# Patient Record
Sex: MALE | Race: WHITE | NOT HISPANIC OR LATINO | Employment: OTHER | URBAN - METROPOLITAN AREA
[De-identification: names, ages, dates, MRNs, and addresses within clinical notes are randomized per-mention and may not be internally consistent; named-entity substitution may affect disease eponyms.]

---

## 2017-01-03 ENCOUNTER — LAB CONVERSION - ENCOUNTER (OUTPATIENT)
Dept: OTHER | Facility: OTHER | Age: 47
End: 2017-01-03

## 2017-01-03 LAB
A/G RATIO (HISTORICAL): 1.6 (CALC) (ref 1–2.5)
ALBUMIN SERPL BCP-MCNC: 3.8 G/DL (ref 3.6–5.1)
ALP SERPL-CCNC: 95 U/L (ref 40–115)
ALT SERPL W P-5'-P-CCNC: 20 U/L (ref 9–46)
AST SERPL W P-5'-P-CCNC: 17 U/L (ref 10–40)
BASOPHILS # BLD AUTO: 0.3 %
BASOPHILS # BLD AUTO: 21 CELLS/UL (ref 0–200)
BILIRUB SERPL-MCNC: 0.8 MG/DL (ref 0.2–1.2)
BUN SERPL-MCNC: 24 MG/DL (ref 7–25)
BUN/CREA RATIO (HISTORICAL): 17 (CALC) (ref 6–22)
CALCIUM SERPL-MCNC: 8.7 MG/DL (ref 8.6–10.3)
CHLORIDE SERPL-SCNC: 108 MMOL/L (ref 98–110)
CHOLEST SERPL-MCNC: 132 MG/DL (ref 125–200)
CHOLEST/HDLC SERPL: 3.1 (CALC)
CO2 SERPL-SCNC: 21 MMOL/L (ref 20–31)
CREAT SERPL-MCNC: 1.39 MG/DL (ref 0.6–1.35)
DEPRECATED RDW RBC AUTO: 14.7 % (ref 11–15)
EGFR AFRICAN AMERICAN (HISTORICAL): 69 ML/MIN/1.73M2
EGFR-AMERICAN CALC (HISTORICAL): 60 ML/MIN/1.73M2
EOSINOPHIL # BLD AUTO: 234 CELLS/UL (ref 15–500)
EOSINOPHIL # BLD AUTO: 3.3 %
GAMMA GLOBULIN (HISTORICAL): 2.4 G/DL (CALC) (ref 1.9–3.7)
GLUCOSE (HISTORICAL): 94 MG/DL (ref 65–99)
HCT VFR BLD AUTO: 47.1 % (ref 38.5–50)
HDLC SERPL-MCNC: 43 MG/DL
HGB BLD-MCNC: 15.5 G/DL (ref 13.2–17.1)
INR PPP: 1.2
LDL CHOLESTEROL (HISTORICAL): 75 MG/DL (CALC)
LYMPHOCYTES # BLD AUTO: 1960 CELLS/UL (ref 850–3900)
LYMPHOCYTES # BLD AUTO: 27.6 %
MCH RBC QN AUTO: 28.4 PG (ref 27–33)
MCHC RBC AUTO-ENTMCNC: 33 G/DL (ref 32–36)
MCV RBC AUTO: 86 FL (ref 80–100)
MONOCYTES # BLD AUTO: 781 CELLS/UL (ref 200–950)
MONOCYTES (HISTORICAL): 11 %
NEUTROPHILS # BLD AUTO: 4104 CELLS/UL (ref 1500–7800)
NEUTROPHILS # BLD AUTO: 57.8 %
NON-HDL-CHOL (CHOL-HDL) (HISTORICAL): 89 MG/DL (CALC)
PLATELET # BLD AUTO: 186 THOUSAND/UL (ref 140–400)
PMV BLD AUTO: 8.4 FL (ref 7.5–11.5)
POTASSIUM SERPL-SCNC: 3.9 MMOL/L (ref 3.5–5.3)
PROTHROMBIN TIME: 11.9 SEC (ref 9–11.5)
RBC # BLD AUTO: 5.48 MILLION/UL (ref 4.2–5.8)
SODIUM SERPL-SCNC: 141 MMOL/L (ref 135–146)
TOTAL PROTEIN (HISTORICAL): 6.2 G/DL (ref 6.1–8.1)
TRIGL SERPL-MCNC: 71 MG/DL
WBC # BLD AUTO: 7.1 THOUSAND/UL (ref 3.8–10.8)

## 2017-01-05 ENCOUNTER — GENERIC CONVERSION - ENCOUNTER (OUTPATIENT)
Dept: OTHER | Facility: OTHER | Age: 47
End: 2017-01-05

## 2017-02-02 DIAGNOSIS — Z94.4 HISTORY OF LIVER TRANSPLANT (HCC): ICD-10-CM

## 2017-02-02 DIAGNOSIS — N18.30 CHRONIC KIDNEY DISEASE, STAGE III (MODERATE) (HCC): ICD-10-CM

## 2017-02-02 DIAGNOSIS — Z41.8 ENCOUNTER FOR OTHER PROCEDURES FOR PURPOSES OTHER THAN REMEDYING HEALTH STATE: ICD-10-CM

## 2017-02-02 DIAGNOSIS — K21.9 GASTRO-ESOPHAGEAL REFLUX DISEASE WITHOUT ESOPHAGITIS: ICD-10-CM

## 2017-02-02 DIAGNOSIS — Z79.899 OTHER LONG TERM (CURRENT) DRUG THERAPY: ICD-10-CM

## 2017-02-02 DIAGNOSIS — E66.9 OBESITY: ICD-10-CM

## 2017-03-03 ENCOUNTER — GENERIC CONVERSION - ENCOUNTER (OUTPATIENT)
Dept: OTHER | Facility: OTHER | Age: 47
End: 2017-03-03

## 2017-03-03 ENCOUNTER — LAB CONVERSION - ENCOUNTER (OUTPATIENT)
Dept: OTHER | Facility: OTHER | Age: 47
End: 2017-03-03

## 2017-03-03 LAB — TACROLIMUS BLOOD (HISTORICAL): 6.5 MCG/L

## 2017-03-09 DIAGNOSIS — Z94.4 HISTORY OF LIVER TRANSPLANT (HCC): ICD-10-CM

## 2017-03-09 DIAGNOSIS — N18.30 CHRONIC KIDNEY DISEASE, STAGE III (MODERATE) (HCC): ICD-10-CM

## 2017-03-09 DIAGNOSIS — K21.9 GASTRO-ESOPHAGEAL REFLUX DISEASE WITHOUT ESOPHAGITIS: ICD-10-CM

## 2017-03-09 DIAGNOSIS — Z79.899 OTHER LONG TERM (CURRENT) DRUG THERAPY: ICD-10-CM

## 2017-03-09 DIAGNOSIS — E66.9 OBESITY: ICD-10-CM

## 2017-03-09 DIAGNOSIS — Z41.8 ENCOUNTER FOR OTHER PROCEDURES FOR PURPOSES OTHER THAN REMEDYING HEALTH STATE: ICD-10-CM

## 2017-03-09 DIAGNOSIS — Z29.8 ENCOUNTER FOR OTHER SPECIFIED PROPHYLACTIC MEASURES: ICD-10-CM

## 2017-04-13 DIAGNOSIS — N18.30 CHRONIC KIDNEY DISEASE, STAGE III (MODERATE) (HCC): ICD-10-CM

## 2017-04-13 DIAGNOSIS — Z79.899 OTHER LONG TERM (CURRENT) DRUG THERAPY: ICD-10-CM

## 2017-04-13 DIAGNOSIS — E66.9 OBESITY: ICD-10-CM

## 2017-04-13 DIAGNOSIS — Z29.8 ENCOUNTER FOR OTHER SPECIFIED PROPHYLACTIC MEASURES: ICD-10-CM

## 2017-04-13 DIAGNOSIS — K21.9 GASTRO-ESOPHAGEAL REFLUX DISEASE WITHOUT ESOPHAGITIS: ICD-10-CM

## 2017-04-13 DIAGNOSIS — Z94.4 HISTORY OF LIVER TRANSPLANT (HCC): ICD-10-CM

## 2017-05-12 ENCOUNTER — LAB CONVERSION - ENCOUNTER (OUTPATIENT)
Dept: OTHER | Facility: OTHER | Age: 47
End: 2017-05-12

## 2017-05-12 LAB
A/G RATIO (HISTORICAL): 1.5 (CALC) (ref 1–2.5)
ALBUMIN SERPL BCP-MCNC: 3.8 G/DL (ref 3.6–5.1)
ALP SERPL-CCNC: 92 U/L (ref 40–115)
ALT SERPL W P-5'-P-CCNC: 13 U/L (ref 9–46)
AST SERPL W P-5'-P-CCNC: 15 U/L (ref 10–40)
BASOPHILS # BLD AUTO: 0 %
BASOPHILS # BLD AUTO: 0 CELLS/UL (ref 0–200)
BILIRUB SERPL-MCNC: 0.9 MG/DL (ref 0.2–1.2)
BUN SERPL-MCNC: 23 MG/DL (ref 7–25)
BUN/CREA RATIO (HISTORICAL): 16 (CALC) (ref 6–22)
CALCIUM SERPL-MCNC: 8.9 MG/DL (ref 8.6–10.3)
CHLORIDE SERPL-SCNC: 108 MMOL/L (ref 98–110)
CHOLEST SERPL-MCNC: 123 MG/DL (ref 125–200)
CHOLEST/HDLC SERPL: 3.5 (CALC)
CO2 SERPL-SCNC: 25 MMOL/L (ref 20–31)
CREAT SERPL-MCNC: 1.42 MG/DL (ref 0.6–1.35)
DEPRECATED RDW RBC AUTO: 14.8 % (ref 11–15)
EGFR AFRICAN AMERICAN (HISTORICAL): 67 ML/MIN/1.73M2
EGFR-AMERICAN CALC (HISTORICAL): 58 ML/MIN/1.73M2
EOSINOPHIL # BLD AUTO: 160 CELLS/UL (ref 15–500)
EOSINOPHIL # BLD AUTO: 2.5 %
GAMMA GLOBULIN (HISTORICAL): 2.5 G/DL (CALC) (ref 1.9–3.7)
GLUCOSE (HISTORICAL): 90 MG/DL (ref 65–99)
HCT VFR BLD AUTO: 47.6 % (ref 38.5–50)
HDLC SERPL-MCNC: 35 MG/DL
HGB BLD-MCNC: 15.9 G/DL (ref 13.2–17.1)
INR PPP: 1.1
LDL CHOLESTEROL (HISTORICAL): 68 MG/DL (CALC)
LYMPHOCYTES # BLD AUTO: 2278 CELLS/UL (ref 850–3900)
LYMPHOCYTES # BLD AUTO: 35.6 %
MCH RBC QN AUTO: 29.3 PG (ref 27–33)
MCHC RBC AUTO-ENTMCNC: 33.4 G/DL (ref 32–36)
MCV RBC AUTO: 87.9 FL (ref 80–100)
MONOCYTES # BLD AUTO: 448 CELLS/UL (ref 200–950)
MONOCYTES (HISTORICAL): 7 %
NEUTROPHILS # BLD AUTO: 3514 CELLS/UL (ref 1500–7800)
NEUTROPHILS # BLD AUTO: 54.9 %
NON-HDL-CHOL (CHOL-HDL) (HISTORICAL): 88 MG/DL (CALC)
PLATELET # BLD AUTO: 190 THOUSAND/UL (ref 140–400)
PMV BLD AUTO: 8.1 FL (ref 7.5–12.5)
POTASSIUM SERPL-SCNC: 4.3 MMOL/L (ref 3.5–5.3)
PROTHROMBIN TIME: 11.7 SEC (ref 9–11.5)
RBC # BLD AUTO: 5.42 MILLION/UL (ref 4.2–5.8)
SODIUM SERPL-SCNC: 141 MMOL/L (ref 135–146)
TACROLIMUS BLOOD (HISTORICAL): 6.8 MCG/L
TOTAL PROTEIN (HISTORICAL): 6.3 G/DL (ref 6.1–8.1)
TRIGL SERPL-MCNC: 100 MG/DL
WBC # BLD AUTO: 6.4 THOUSAND/UL (ref 3.8–10.8)

## 2017-05-18 DIAGNOSIS — Z94.4 HISTORY OF LIVER TRANSPLANT (HCC): ICD-10-CM

## 2017-05-18 DIAGNOSIS — N18.30 CHRONIC KIDNEY DISEASE, STAGE III (MODERATE) (HCC): ICD-10-CM

## 2017-05-18 DIAGNOSIS — E66.9 OBESITY: ICD-10-CM

## 2017-05-18 DIAGNOSIS — K21.9 GASTRO-ESOPHAGEAL REFLUX DISEASE WITHOUT ESOPHAGITIS: ICD-10-CM

## 2017-05-18 DIAGNOSIS — Z29.8 ENCOUNTER FOR OTHER SPECIFIED PROPHYLACTIC MEASURES: ICD-10-CM

## 2017-05-18 DIAGNOSIS — Z79.899 OTHER LONG TERM (CURRENT) DRUG THERAPY: ICD-10-CM

## 2017-06-22 DIAGNOSIS — I10 ESSENTIAL (PRIMARY) HYPERTENSION: ICD-10-CM

## 2017-06-22 DIAGNOSIS — Z79.899 OTHER LONG TERM (CURRENT) DRUG THERAPY: ICD-10-CM

## 2017-06-22 DIAGNOSIS — K21.9 GASTRO-ESOPHAGEAL REFLUX DISEASE WITHOUT ESOPHAGITIS: ICD-10-CM

## 2017-06-22 DIAGNOSIS — E66.9 OBESITY: ICD-10-CM

## 2017-06-22 DIAGNOSIS — Z94.4 HISTORY OF LIVER TRANSPLANT (HCC): ICD-10-CM

## 2017-06-22 DIAGNOSIS — Z29.8 ENCOUNTER FOR OTHER SPECIFIED PROPHYLACTIC MEASURES: ICD-10-CM

## 2017-06-22 DIAGNOSIS — N18.30 CHRONIC KIDNEY DISEASE, STAGE III (MODERATE) (HCC): ICD-10-CM

## 2017-06-22 DIAGNOSIS — R60.9 EDEMA: ICD-10-CM

## 2017-06-29 ENCOUNTER — ALLSCRIPTS OFFICE VISIT (OUTPATIENT)
Dept: OTHER | Facility: OTHER | Age: 47
End: 2017-06-29

## 2017-07-07 ENCOUNTER — LAB CONVERSION - ENCOUNTER (OUTPATIENT)
Dept: OTHER | Facility: OTHER | Age: 47
End: 2017-07-07

## 2017-07-07 ENCOUNTER — GENERIC CONVERSION - ENCOUNTER (OUTPATIENT)
Dept: OTHER | Facility: OTHER | Age: 47
End: 2017-07-07

## 2017-07-07 LAB
A/G RATIO (HISTORICAL): 1.5 (CALC) (ref 1–2.5)
ALBUMIN SERPL BCP-MCNC: 3.7 G/DL (ref 3.6–5.1)
ALP SERPL-CCNC: 97 U/L (ref 40–115)
ALT SERPL W P-5'-P-CCNC: 14 U/L (ref 9–46)
AST SERPL W P-5'-P-CCNC: 15 U/L (ref 10–40)
BASOPHILS # BLD AUTO: 0.2 %
BASOPHILS # BLD AUTO: 13 CELLS/UL (ref 0–200)
BILIRUB SERPL-MCNC: 1 MG/DL (ref 0.2–1.2)
BUN SERPL-MCNC: 21 MG/DL (ref 7–25)
BUN/CREA RATIO (HISTORICAL): 15 (CALC) (ref 6–22)
CALCIUM SERPL-MCNC: 8.9 MG/DL (ref 8.6–10.3)
CHLORIDE SERPL-SCNC: 110 MMOL/L (ref 98–110)
CHOLEST SERPL-MCNC: 121 MG/DL (ref 125–200)
CHOLEST/HDLC SERPL: 3.2 (CALC)
CO2 SERPL-SCNC: 24 MMOL/L (ref 20–31)
CREAT SERPL-MCNC: 1.38 MG/DL (ref 0.6–1.35)
DEPRECATED RDW RBC AUTO: 15.7 % (ref 11–15)
EGFR AFRICAN AMERICAN (HISTORICAL): 70 ML/MIN/1.73M2
EGFR-AMERICAN CALC (HISTORICAL): 60 ML/MIN/1.73M2
EOSINOPHIL # BLD AUTO: 195 CELLS/UL (ref 15–500)
EOSINOPHIL # BLD AUTO: 3 %
GAMMA GLOBULIN (HISTORICAL): 2.5 G/DL (CALC) (ref 1.9–3.7)
GLUCOSE (HISTORICAL): 92 MG/DL (ref 65–99)
HCT VFR BLD AUTO: 48.6 % (ref 38.5–50)
HDLC SERPL-MCNC: 38 MG/DL
HGB BLD-MCNC: 16 G/DL (ref 13.2–17.1)
INR PPP: 1.1
LDL CHOLESTEROL (HISTORICAL): 68 MG/DL (CALC)
LYMPHOCYTES # BLD AUTO: 1983 CELLS/UL (ref 850–3900)
LYMPHOCYTES # BLD AUTO: 30.5 %
MCH RBC QN AUTO: 28.8 PG (ref 27–33)
MCHC RBC AUTO-ENTMCNC: 32.8 G/DL (ref 32–36)
MCV RBC AUTO: 87.7 FL (ref 80–100)
MONOCYTES # BLD AUTO: 494 CELLS/UL (ref 200–950)
MONOCYTES (HISTORICAL): 7.6 %
NEUTROPHILS # BLD AUTO: 3816 CELLS/UL (ref 1500–7800)
NEUTROPHILS # BLD AUTO: 58.7 %
NON-HDL-CHOL (CHOL-HDL) (HISTORICAL): 83 MG/DL (CALC)
PLATELET # BLD AUTO: 205 THOUSAND/UL (ref 140–400)
PMV BLD AUTO: 8.1 FL (ref 7.5–12.5)
POTASSIUM SERPL-SCNC: 4.3 MMOL/L (ref 3.5–5.3)
PROTHROMBIN TIME: 11.5 SEC (ref 9–11.5)
RBC # BLD AUTO: 5.54 MILLION/UL (ref 4.2–5.8)
SODIUM SERPL-SCNC: 143 MMOL/L (ref 135–146)
TOTAL PROTEIN (HISTORICAL): 6.2 G/DL (ref 6.1–8.1)
TRIGL SERPL-MCNC: 73 MG/DL
WBC # BLD AUTO: 6.5 THOUSAND/UL (ref 3.8–10.8)

## 2017-07-27 DIAGNOSIS — E66.9 OBESITY: ICD-10-CM

## 2017-07-27 DIAGNOSIS — Z79.899 OTHER LONG TERM (CURRENT) DRUG THERAPY: ICD-10-CM

## 2017-07-27 DIAGNOSIS — Z29.8 ENCOUNTER FOR OTHER SPECIFIED PROPHYLACTIC MEASURES: ICD-10-CM

## 2017-07-27 DIAGNOSIS — Z94.4 HISTORY OF LIVER TRANSPLANT (HCC): ICD-10-CM

## 2017-07-27 DIAGNOSIS — K21.9 GASTRO-ESOPHAGEAL REFLUX DISEASE WITHOUT ESOPHAGITIS: ICD-10-CM

## 2017-07-27 DIAGNOSIS — N18.30 CHRONIC KIDNEY DISEASE, STAGE III (MODERATE) (HCC): ICD-10-CM

## 2017-08-31 DIAGNOSIS — Z94.4 HISTORY OF LIVER TRANSPLANT (HCC): ICD-10-CM

## 2017-08-31 DIAGNOSIS — Z79.899 OTHER LONG TERM (CURRENT) DRUG THERAPY: ICD-10-CM

## 2017-08-31 DIAGNOSIS — K21.9 GASTRO-ESOPHAGEAL REFLUX DISEASE WITHOUT ESOPHAGITIS: ICD-10-CM

## 2017-08-31 DIAGNOSIS — N18.30 CHRONIC KIDNEY DISEASE, STAGE III (MODERATE) (HCC): ICD-10-CM

## 2017-08-31 DIAGNOSIS — E66.9 OBESITY: ICD-10-CM

## 2017-08-31 DIAGNOSIS — Z29.8 ENCOUNTER FOR OTHER SPECIFIED PROPHYLACTIC MEASURES: ICD-10-CM

## 2017-09-08 ENCOUNTER — GENERIC CONVERSION - ENCOUNTER (OUTPATIENT)
Dept: OTHER | Facility: OTHER | Age: 47
End: 2017-09-08

## 2017-09-08 ENCOUNTER — LAB CONVERSION - ENCOUNTER (OUTPATIENT)
Dept: OTHER | Facility: OTHER | Age: 47
End: 2017-09-08

## 2017-09-08 LAB
A/G RATIO (HISTORICAL): 1.6 (CALC) (ref 1–2.5)
ALBUMIN SERPL BCP-MCNC: 3.5 G/DL (ref 3.6–5.1)
ALP SERPL-CCNC: 88 U/L (ref 40–115)
ALT SERPL W P-5'-P-CCNC: 13 U/L (ref 9–46)
AST SERPL W P-5'-P-CCNC: 13 U/L (ref 10–40)
BASOPHILS # BLD AUTO: 0.3 %
BASOPHILS # BLD AUTO: 20 CELLS/UL (ref 0–200)
BILIRUB SERPL-MCNC: 0.8 MG/DL (ref 0.2–1.2)
BUN SERPL-MCNC: 23 MG/DL (ref 7–25)
BUN/CREA RATIO (HISTORICAL): ABNORMAL (CALC) (ref 6–22)
CALCIUM SERPL-MCNC: 8.9 MG/DL (ref 8.6–10.3)
CHLORIDE SERPL-SCNC: 109 MMOL/L (ref 98–110)
CHOLEST SERPL-MCNC: 124 MG/DL
CHOLEST/HDLC SERPL: 3.4 (CALC)
CO2 SERPL-SCNC: 27 MMOL/L (ref 20–31)
CREAT SERPL-MCNC: 1.23 MG/DL (ref 0.6–1.35)
DEPRECATED RDW RBC AUTO: 14.7 % (ref 11–15)
EGFR AFRICAN AMERICAN (HISTORICAL): 80 ML/MIN/1.73M2
EGFR-AMERICAN CALC (HISTORICAL): 69 ML/MIN/1.73M2
EOSINOPHIL # BLD AUTO: 288 CELLS/UL (ref 15–500)
EOSINOPHIL # BLD AUTO: 4.3 %
GAMMA GLOBULIN (HISTORICAL): 2.2 G/DL (CALC) (ref 1.9–3.7)
GLUCOSE (HISTORICAL): 101 MG/DL (ref 65–99)
HCT VFR BLD AUTO: 45.2 % (ref 38.5–50)
HDLC SERPL-MCNC: 37 MG/DL
HGB BLD-MCNC: 15.3 G/DL (ref 13.2–17.1)
INR PPP: 1.1
LDL CHOLESTEROL (HISTORICAL): 72 MG/DL (CALC)
LYMPHOCYTES # BLD AUTO: 2050 CELLS/UL (ref 850–3900)
LYMPHOCYTES # BLD AUTO: 30.6 %
MCH RBC QN AUTO: 29.3 PG (ref 27–33)
MCHC RBC AUTO-ENTMCNC: 33.8 G/DL (ref 32–36)
MCV RBC AUTO: 86.6 FL (ref 80–100)
MONOCYTES # BLD AUTO: 556 CELLS/UL (ref 200–950)
MONOCYTES (HISTORICAL): 8.3 %
NEUTROPHILS # BLD AUTO: 3786 CELLS/UL (ref 1500–7800)
NEUTROPHILS # BLD AUTO: 56.5 %
NON-HDL-CHOL (CHOL-HDL) (HISTORICAL): 87 MG/DL (CALC)
PLATELET # BLD AUTO: 188 THOUSAND/UL (ref 140–400)
PMV BLD AUTO: 10 FL (ref 7.5–12.5)
POTASSIUM SERPL-SCNC: 4.3 MMOL/L (ref 3.5–5.3)
PROTHROMBIN TIME: 11.2 SEC (ref 9–11.5)
RBC # BLD AUTO: 5.22 MILLION/UL (ref 4.2–5.8)
SODIUM SERPL-SCNC: 143 MMOL/L (ref 135–146)
TOTAL PROTEIN (HISTORICAL): 5.7 G/DL (ref 6.1–8.1)
TRIGL SERPL-MCNC: 72 MG/DL
WBC # BLD AUTO: 6.7 THOUSAND/UL (ref 3.8–10.8)

## 2017-10-05 DIAGNOSIS — N18.30 CHRONIC KIDNEY DISEASE, STAGE III (MODERATE) (HCC): ICD-10-CM

## 2017-10-05 DIAGNOSIS — Z29.8 ENCOUNTER FOR OTHER SPECIFIED PROPHYLACTIC MEASURES: ICD-10-CM

## 2017-10-05 DIAGNOSIS — Z94.4 HISTORY OF LIVER TRANSPLANT (HCC): ICD-10-CM

## 2017-10-05 DIAGNOSIS — K21.9 GASTRO-ESOPHAGEAL REFLUX DISEASE WITHOUT ESOPHAGITIS: ICD-10-CM

## 2017-10-05 DIAGNOSIS — Z79.899 OTHER LONG TERM (CURRENT) DRUG THERAPY: ICD-10-CM

## 2017-10-05 DIAGNOSIS — E66.9 OBESITY: ICD-10-CM

## 2017-10-12 ENCOUNTER — ALLSCRIPTS OFFICE VISIT (OUTPATIENT)
Dept: OTHER | Facility: OTHER | Age: 47
End: 2017-10-12

## 2017-11-09 DIAGNOSIS — E66.9 OBESITY: ICD-10-CM

## 2017-11-09 DIAGNOSIS — Z79.899 OTHER LONG TERM (CURRENT) DRUG THERAPY: ICD-10-CM

## 2017-11-09 DIAGNOSIS — N18.30 CHRONIC KIDNEY DISEASE, STAGE III (MODERATE) (HCC): ICD-10-CM

## 2017-11-09 DIAGNOSIS — Z29.8 ENCOUNTER FOR OTHER SPECIFIED PROPHYLACTIC MEASURES: ICD-10-CM

## 2017-11-09 DIAGNOSIS — K21.9 GASTRO-ESOPHAGEAL REFLUX DISEASE WITHOUT ESOPHAGITIS: ICD-10-CM

## 2017-11-09 DIAGNOSIS — Z94.4 HISTORY OF LIVER TRANSPLANT (HCC): ICD-10-CM

## 2017-12-14 ENCOUNTER — LAB CONVERSION - ENCOUNTER (OUTPATIENT)
Dept: OTHER | Facility: OTHER | Age: 47
End: 2017-12-14

## 2017-12-14 DIAGNOSIS — E66.9 OBESITY: ICD-10-CM

## 2017-12-14 DIAGNOSIS — Z29.8 ENCOUNTER FOR OTHER SPECIFIED PROPHYLACTIC MEASURES: ICD-10-CM

## 2017-12-14 DIAGNOSIS — Z79.899 OTHER LONG TERM (CURRENT) DRUG THERAPY: ICD-10-CM

## 2017-12-14 DIAGNOSIS — K21.9 GASTRO-ESOPHAGEAL REFLUX DISEASE WITHOUT ESOPHAGITIS: ICD-10-CM

## 2017-12-14 DIAGNOSIS — Z94.4 HISTORY OF LIVER TRANSPLANT (HCC): ICD-10-CM

## 2017-12-14 DIAGNOSIS — N18.30 CHRONIC KIDNEY DISEASE, STAGE III (MODERATE) (HCC): ICD-10-CM

## 2017-12-14 LAB — TACROLIMUS BLOOD (HISTORICAL): 7.2 MCG/L

## 2018-01-09 NOTE — RESULT NOTES
Message   chemiistry/ cbc/ tac level/ipid profile - stable     Verified Results  (1) COMPREHENSIVE METABOLIC PANEL [id number] 43:80SN Alan Emmanuel     Test Name Result Flag Reference   GLUCOSE 96 mg/dL  65-99   Fasting reference interval   UREA NITROGEN (BUN) 22 mg/dL  7-25   CREATININE 1 31 mg/dL  0 60-1 35   eGFR NON-AFR  AMERICAN 64 mL/min/1 73m2  > OR = 60   eGFR AFRICAN AMERICAN 74 mL/min/1 73m2  > OR = 60   BUN/CREATININE RATIO   1-45   NOT APPLICABLE (calc)   SODIUM 141 mmol/L  135-146   POTASSIUM 3 9 mmol/L  3 5-5 3   CHLORIDE 108 mmol/L     CARBON DIOXIDE 22 mmol/L  20-31   CALCIUM 8 7 mg/dL  8 6-10 3   PROTEIN, TOTAL 6 3 g/dL  6 1-8 1   ALBUMIN 3 8 g/dL  3 6-5 1   GLOBULIN 2 5 g/dL (calc)  1 9-3 7   ALBUMIN/GLOBULIN RATIO 1 5 (calc)  1 0-2 5   BILIRUBIN, TOTAL 0 8 mg/dL  0 2-1 2   ALKALINE PHOSPHATASE 86 U/L     AST 14 U/L  10-40   ALT 14 U/L  9-46     (1) LIPID PANEL, FASTING [id number] 08:26AM Claudjere Lulas     Test Name Result Flag Reference   CHOLESTEROL, TOTAL 119 mg/dL L 125-200   HDL CHOLESTEROL 36 mg/dL L > OR = 40   TRIGLICERIDES 85 mg/dL  <706   LDL-CHOLESTEROL 66 mg/dL (calc)  <130   Desirable range <100 mg/dL for patients with CHD or  diabetes and <70 mg/dL for diabetic patients with  known heart disease  CHOL/HDLC RATIO 3 3 (calc)  < OR = 5 0   NON HDL CHOLESTEROL 83 mg/dL (calc)     Target for non-HDL cholesterol is 30 mg/dL higher than   LDL cholesterol target  (1) PT WITH INR [id number] 08:26AM Claudjere Lulas     Test Name Result Flag Reference   INR 1 1     Reference Range                     0 9-1 1  Moderate-intensity Warfarin Therapy 2 0-3 0  Higher-intensity Warfarin Therapy   3 0-4 0   PT 11 5 sec  9 0-11 5   For more information on this test, go to:  http://[employer]/faq/WMC127     (1) CBC/PLT/DIFF [id number] 08:26AM Claudis Lulas     Test Name Result Flag Reference   WHITE BLOOD CELL COUNT 7 3 Thousand/uL  3 8-10 8   RED BLOOD CELL COUNT 5 55 Million/uL  4 20-5 80   HEMOGLOBIN 15 9 g/dL  13 2-17 1   HEMATOCRIT 47 7 %  38 5-50 0   MCV 86 0 fL  80 0-100 0   MCH 28 6 pg  27 0-33 0   MCHC 33 3 g/dL  32 0-36 0   RDW 14 8 %  11 0-15 0   PLATELET COUNT 458 Thousand/uL  140-400   MPV 8 2 fL  7 5-12 5   ABSOLUTE NEUTROPHILS 4365 cells/uL  4558-4317   ABSOLUTE LYMPHOCYTES 2066 cells/uL  850-3900   ABSOLUTE MONOCYTES 555 cells/uL  200-950   ABSOLUTE EOSINOPHILS 307 cells/uL     ABSOLUTE BASOPHILS 7 cells/uL  0-200   NEUTROPHILS 59 8 %     LYMPHOCYTES 28 3 %     MONOCYTES 7 6 %     EOSINOPHILS 4 2 %     BASOPHILS 0 1 %       (Q) TACROLIMUS, HIGHLY SENSITIVE, LC/MS/MS [id number] 08:26AM [facility]   REPORT COMMENT:  FASTING:YES     Test Name Result Flag Reference   TACROLIMUS, HIGHLY SENSITIVE, LC/MS/MS 6 5 mcg/L     No definitive therapeutic or toxic ranges have been  established  Optimal blood drug levels are influenced  by type of transplant, patient response, time post-  transplant, co-administration of other drugs, and   drug formulation  The following trough range is a   suggested guideline: 5 0-20 0 mcg/L

## 2018-01-11 NOTE — RESULT NOTES
WHITE BLOOD CELL COUNT 6 5 Thousand/uL  3 8-10 8   RED BLOOD CELL COUNT 5 54 Million/uL  4 20-5 80   HEMOGLOBIN 16 0 g/dL  13 2-17 1   HEMATOCRIT 48 6 %  38 5-50 0   MCV 87 7 fL  80 0-100 0   MCH 28 8 pg  27 0-33 0   MCHC 32 8 g/dL  32 0-36 0   RDW 15 7 % H 11 0-15 0   PLATELET COUNT 301 Thousand/uL  140-400   ABSOLUTE NEUTROPHILS 3816 cells/uL  2964-2111   ABSOLUTE LYMPHOCYTES 1983 cells/uL  850-3900   ABSOLUTE MONOCYTES 494 cells/uL  200-950   ABSOLUTE EOSINOPHILS 195 cells/uL     ABSOLUTE BASOPHILS 13 cells/uL  0-200   NEUTROPHILS 58 7 %     LYMPHOCYTES 30 5 %     MONOCYTES 7 6 %     EOSINOPHILS 3 0 %     BASOPHILS 0 2 %     MPV 8 1 fL  7 5-12 5     (1) PT WITH INR [id number] 07:23AM Sahara Atif     Test Name Result Flag Reference   INR 1 1     Reference Range                     0 9-1 1  Moderate-intensity Warfarin Therapy 2 0-3 0  Higher-intensity Warfarin Therapy   3 0-4 0   PT 11 5 sec  9 0-11 5   For more information on this test, go to:  http://[employer]  [employer]/faq/PEN733

## 2018-01-14 VITALS
DIASTOLIC BLOOD PRESSURE: 80 MMHG | BODY MASS INDEX: 45.1 KG/M2 | OXYGEN SATURATION: 98 % | HEART RATE: 90 BPM | WEIGHT: 315 LBS | TEMPERATURE: 97.9 F | SYSTOLIC BLOOD PRESSURE: 122 MMHG | HEIGHT: 70 IN | RESPIRATION RATE: 12 BRPM

## 2018-01-15 NOTE — RESULT NOTES
ALBUMIN/GLOBULIN RATIO 1 6 (calc)  1 0-2 5   BILIRUBIN, TOTAL 0 8 mg/dL  0 2-1 2   ALKALINE PHOSPHATASE 95 U/L     AST 17 U/L  10-40   ALT 20 U/L  9-46     (1) CBC/PLT/DIFF [id number] 07:38AM Timmothy Course     Test Name Result Flag Reference   WHITE BLOOD CELL COUNT 7 1 Thousand/uL  3 8-10 8   RED BLOOD CELL COUNT 5 48 Million/uL  4 20-5 80   HEMOGLOBIN 15 5 g/dL  13 2-17 1   HEMATOCRIT 47 1 %  38 5-50 0   MCV 86 0 fL  80 0-100 0   MCH 28 4 pg  27 0-33 0   MCHC 33 0 g/dL  32 0-36 0   RDW 14 7 %  11 0-15 0   PLATELET COUNT 944 Thousand/uL  140-400   MPV 8 4 fL  7 5-11 5   ABSOLUTE NEUTROPHILS 4104 cells/uL  4578-8607   ABSOLUTE LYMPHOCYTES 1960 cells/uL  850-3900   ABSOLUTE MONOCYTES 781 cells/uL  200-950   ABSOLUTE EOSINOPHILS 234 cells/uL     ABSOLUTE BASOPHILS 21 cells/uL  0-200   NEUTROPHILS 57 8 %     LYMPHOCYTES 27 6 %     MONOCYTES 11 0 %     EOSINOPHILS 3 3 %     BASOPHILS 0 3 %       (1) PT WITH INR [id number] 07:38AM Timmothy Course     Test Name Result Flag Reference   INR 1 2 H    Reference Range                     0 9-1 1  Moderate-intensity Warfarin Therapy 2 0-3 0  Higher-intensity Warfarin Therapy   3 0-4 0   PT 11 9 sec H 9 0-11 5   For more information on this test, go to:  http://[employer]/faq/ZIM815

## 2018-01-15 NOTE — RESULT NOTES
Verified Results  (Q) TACROLIMUS, HIGHLY SENSITIVE, LC/MS/MS [id number] 07:53AM Felicia Rosario   REPORT COMMENT:  FASTING:YES     Test Name Result Flag Reference   TACROLIMUS, HIGHLY SENSITIVE, LC/MS/MS 5 2 mcg/L     No definitive therapeutic or toxic ranges have been  established  Optimal blood drug levels are influenced  by type of transplant, patient response, time post-  transplant, co-administration of other drugs, and   drug formulation  The following trough range is a   suggested guideline: 5 0-20 0 mcg/L  (1) LIPID PANEL, FASTING [id number] 07:53AM Jayne Libel     Test Name Result Flag Reference   CHOLESTEROL, TOTAL 124 mg/dL  <200   HDL CHOLESTEROL 37 mg/dL L >80   TRIGLICERIDES 72 mg/dL  <448   LDL-CHOLESTEROL 72 mg/dL (calc)     Reference range: <100     Desirable range <100 mg/dL for patients with CHD or  diabetes and <70 mg/dL for diabetic patients with  known heart disease  The Clay-Fisher calculation is a validated novel   method that provides better accuracy than the   Friedewald equation in the estimation of LDL-C,   particularly when TG levels are 150-400 mg/dL and   LDL-C levels are lower than 70 mg/dL  Reference:  Gita Davis et al  Comparison of a Novel   Method vs the Friedewald Equation for Estimating   Low-Density Lipoprotein Cholesterol Levels From the   Standard Lipid Profile  PAULIE  8232;018(38): 8110-2066  For additional information, please refer to  http://[employer]/faq/LRG625  (This link is being provided for informational/  educational purposes only )   CHOL/HDLC RATIO 3 4 (calc)  <5 0   NON HDL CHOLESTEROL 87 mg/dL (calc)  <130   For patients with diabetes plus 1 major ASCVD risk   factor, treating to a non-HDL-C goal of <100 mg/dL   (LDL-C of <70 mg/dL) is considered a therapeutic   option       (1) COMPREHENSIVE METABOLIC PANEL [id number] 81:63RS Jayne Libel     Test Name Result Flag Reference   GLUCOSE 101 mg/dL H 65-99   Fasting reference interval     For someone without known diabetes, a glucose value  between 100 and 125 mg/dL is consistent with  prediabetes and should be confirmed with a  follow-up test    UREA NITROGEN (BUN) 23 mg/dL  7-25   CREATININE 1 23 mg/dL  0 60-1 35   eGFR NON-AFR  AMERICAN 69 mL/min/1 73m2  > OR = 60   eGFR AFRICAN AMERICAN 80 mL/min/1 73m2  > OR = 60   BUN/CREATININE RATIO   6-49   NOT APPLICABLE (calc)   SODIUM 143 mmol/L  135-146   POTASSIUM 4 3 mmol/L  3 5-5 3   CHLORIDE 109 mmol/L     CARBON DIOXIDE 27 mmol/L  20-31   CALCIUM 8 9 mg/dL  8 6-10 3   PROTEIN, TOTAL 5 7 g/dL L 6 1-8 1   ALBUMIN 3 5 g/dL L 3 6-5 1   GLOBULIN 2 2 g/dL (calc)  1 9-3 7   ALBUMIN/GLOBULIN RATIO 1 6 (calc)  1 0-2 5   BILIRUBIN, TOTAL 0 8 mg/dL  0 2-1 2   ALKALINE PHOSPHATASE 88 U/L     AST 13 U/L  10-40   ALT 13 U/L  9-46     (1) CBC/PLT/DIFF [id number] 07:53AM [facility]     Test Name Result Flag Reference   WHITE BLOOD CELL COUNT 6 7 Thousand/uL  3 8-10 8   RED BLOOD CELL COUNT 5 22 Million/uL  4 20-5 80   HEMOGLOBIN 15 3 g/dL  13 2-17 1   HEMATOCRIT 45 2 %  38 5-50 0   MCV 86 6 fL  80 0-100 0   MCH 29 3 pg  27 0-33 0   MCHC 33 8 g/dL  32 0-36 0   RDW 14 7 %  11 0-15 0   PLATELET COUNT 090 Thousand/uL  140-400   ABSOLUTE NEUTROPHILS 3786 cells/uL  8116-7479   ABSOLUTE LYMPHOCYTES 2050 cells/uL  850-3900   ABSOLUTE MONOCYTES 556 cells/uL  200-950   ABSOLUTE EOSINOPHILS 288 cells/uL     ABSOLUTE BASOPHILS 20 cells/uL  0-200   NEUTROPHILS 56 5 %     LYMPHOCYTES 30 6 %     MONOCYTES 8 3 %     EOSINOPHILS 4 3 %     BASOPHILS 0 3 %     MPV 10 0 fL  7 5-12 5     (1) PT WITH INR [id number] 07:53AM [facility]     Test Name Result Flag Reference   INR 1 1     Reference Range                     0 9-1 1  Moderate-intensity Warfarin Therapy 2 0-3 0  Higher-intensity Warfarin Therapy   3 0-4 0   PT 11 2 sec  9 0-11 5   For more information on this test, go to:  http://[employer]/faq/HRX075

## 2018-01-16 NOTE — PROGRESS NOTES
Chief Complaint  pt here for flu vaccine  pt tolerated well  please see flowsheet      Active Problems    1  Abnormal CT scan (793 99) (R93 8)   2  Chronic kidney disease, stage 3 (585 3) (N18 3)   3  Edema (782 3) (R60 9)   4  GERD without esophagitis (530 81) (K21 9)   5  Hypertension (401 9) (I10)   6  Incisional hernia following transplant (553 21) (K43 2)   7  Influenza vaccine needed (V04 81) (Z23)   8  Liver replaced by transplant (V42 7) (Z94 4)   9  Long term use of drug (V58 69) (Z79 899)   10  Need for DTaP vaccination (V06 1) (Z23)   11  Need for prophylactic vaccination and inoculation against influenza (V04 81) (Z23)   12  Need for vaccination with 13-polyvalent pneumococcal conjugate vaccine (V03 82) (Z23)   13  Nephrolithiasis (592 0) (N20 0)   14  Obesity (278 00) (E66 9)   15  Prophylactic immunotherapy (V07 2) (Z29 8)    Current Meds   1  AmLODIPine Besylate 5 MG Oral Tablet; take 1 tablet by mouth daily; Therapy: 65PQL7638 to (Evaluate:46Adw2309)  Requested for: 12YNP9219; Last   Rx:29Jun2017 Ordered   2  Fish Oil 1000 MG Oral Capsule; Take 2 caps daily; Therapy: 61WNQ5679 to Recorded   3  Furosemide 40 MG Oral Tablet; TAKE 1 TABLET BY MOUTH EVERY DAY FOR FLUID   RETENTION; Therapy: 94DBS7406 to (Evaluate:66Dwa2428)  Requested for: 50BAL2873; Last   Rx:29Jun2017 Ordered   4  Metoprolol Tartrate 25 MG Oral Tablet; take 1 tablet by mouth twice daily; Therapy: 26CLD1176 to (Meka Anis)  Requested for: 16KIL3531; Last   Rx:05Oct2017 Ordered   5  Multi Complete Oral Capsule; TAKE 1 CAPSULE DAILY; Therapy: 77TJF9950 to Recorded   6  Omeprazole 20 MG Oral Capsule Delayed Release; take 1 capsule by mouth daily; Therapy: 19ALD0105 to (Evaluate:99Mkr3373)  Requested for: 97NRJ8844; Last   Rx:30Ija7619 Ordered   7  Prograf 0 5 MG Oral Capsule; TAKE AS DIRECTED; Therapy: 19ADY8502 to Recorded   8  Prograf 1 MG Oral Capsule; TAKE AS DIRECTED;    Therapy: 46MFS4729 to Recorded    Allergies    1  No Known Drug Allergies    Assessment    1   Influenza vaccine needed (V04 81) (Z23)    Plan  Influenza vaccine needed    · Fluzone Quadrivalent Intramuscular Suspension    Future Appointments    Date/Time Provider Specialty Site   12/29/2017 10:00 AM [address]i [address] [facility]     Signatures   Electronically signed by : Dary Calzada; Oct 13 2017  3:15PM EST                       (Author)    Electronically signed by : TIP Balderas ; Oct 16 2017  9:19AM EST                       (Author)

## 2018-02-05 ENCOUNTER — OFFICE VISIT (OUTPATIENT)
Dept: FAMILY MEDICINE CLINIC | Facility: CLINIC | Age: 48
End: 2018-02-05
Payer: COMMERCIAL

## 2018-02-05 VITALS
WEIGHT: 315 LBS | TEMPERATURE: 98.8 F | DIASTOLIC BLOOD PRESSURE: 80 MMHG | HEIGHT: 69 IN | OXYGEN SATURATION: 98 % | RESPIRATION RATE: 16 BRPM | BODY MASS INDEX: 46.65 KG/M2 | SYSTOLIC BLOOD PRESSURE: 114 MMHG | HEART RATE: 74 BPM

## 2018-02-05 DIAGNOSIS — N18.30 CHRONIC KIDNEY DISEASE, STAGE 3 (HCC): ICD-10-CM

## 2018-02-05 DIAGNOSIS — R40.4 TRANSIENT ALTERATION OF AWARENESS: Primary | ICD-10-CM

## 2018-02-05 DIAGNOSIS — Z94.4 STATUS POST LIVER TRANSPLANTATION (HCC): ICD-10-CM

## 2018-02-05 DIAGNOSIS — I10 ESSENTIAL HYPERTENSION: ICD-10-CM

## 2018-02-05 DIAGNOSIS — K21.9 GASTROESOPHAGEAL REFLUX DISEASE, ESOPHAGITIS PRESENCE NOT SPECIFIED: ICD-10-CM

## 2018-02-05 DIAGNOSIS — Z94.4 LIVER TRANSPLANTED (HCC): ICD-10-CM

## 2018-02-05 DIAGNOSIS — K21.9 GERD WITHOUT ESOPHAGITIS: ICD-10-CM

## 2018-02-05 PROCEDURE — 99214 OFFICE O/P EST MOD 30 MIN: CPT | Performed by: NURSE PRACTITIONER

## 2018-02-05 RX ORDER — FUROSEMIDE 40 MG/1
40 TABLET ORAL DAILY
Qty: 30 TABLET | Refills: 5 | Status: SHIPPED | OUTPATIENT
Start: 2018-02-05 | End: 2018-06-03 | Stop reason: SDUPTHER

## 2018-02-05 RX ORDER — FUROSEMIDE 40 MG/1
TABLET ORAL
COMMUNITY
Start: 2013-06-24 | End: 2018-02-05 | Stop reason: SDUPTHER

## 2018-02-05 RX ORDER — METOPROLOL SUCCINATE 25 MG/1
25 TABLET, EXTENDED RELEASE ORAL 2 TIMES DAILY
Qty: 60 TABLET | Refills: 5 | Status: SHIPPED | OUTPATIENT
Start: 2018-02-05 | End: 2018-07-12 | Stop reason: SDUPTHER

## 2018-02-05 RX ORDER — OMEPRAZOLE 20 MG/1
20 CAPSULE, DELAYED RELEASE ORAL DAILY
Qty: 30 CAPSULE | Refills: 5 | Status: SHIPPED | OUTPATIENT
Start: 2018-02-05 | End: 2018-08-01 | Stop reason: SDUPTHER

## 2018-02-05 RX ORDER — TACROLIMUS 0.5 MG/1
CAPSULE ORAL
COMMUNITY
Start: 2013-06-24 | End: 2020-04-29 | Stop reason: ALTCHOICE

## 2018-02-05 RX ORDER — AMLODIPINE BESYLATE 5 MG/1
5 TABLET ORAL 2 TIMES DAILY
Qty: 60 TABLET | Refills: 5 | Status: SHIPPED | OUTPATIENT
Start: 2018-02-05 | End: 2018-07-12 | Stop reason: SDUPTHER

## 2018-02-05 RX ORDER — CHLORAL HYDRATE 500 MG
CAPSULE ORAL DAILY
COMMUNITY
Start: 2017-06-29

## 2018-02-05 RX ORDER — OMEPRAZOLE 20 MG/1
1 CAPSULE, DELAYED RELEASE ORAL DAILY
COMMUNITY
Start: 2013-06-24 | End: 2018-02-05 | Stop reason: SDUPTHER

## 2018-02-05 NOTE — PATIENT INSTRUCTIONS
Altered Mental Status   WHAT YOU NEED TO KNOW:   Altered mental status (AMS) is a disruption in how your brain works that causes a change in behavior  This change can happen suddenly or over days  AMS ranges from slight confusion to total disorientation and increased sleepiness to coma  DISCHARGE INSTRUCTIONS:   Medicines:   · Antibiotics  help fight or prevent infection  Take your antibiotics until they are gone, even if you feel better  · Take your medicine as directed  Contact your healthcare provider if you think your medicine is not helping or if you have side effects  Tell him of her if you are allergic to any medicine  Keep a list of the medicines, vitamins, and herbs you take  Include the amounts, and when and why you take them  Bring the list or the pill bottles to follow-up visits  Carry your medicine list with you in case of an emergency  Follow up with your healthcare provider as directed:  Write down your questions so you remember to ask them during your visits  Contact your healthcare provider if:   · You have sudden changes in behavior  · You are more sleepy or confused than usual      · You have questions or concerns about your condition or care  Seek care immediately or call 911 if:   · Your speech is slurred or you are rambling  · You have a seizure  · You are not able to move any part of your body freely  · Someone close to you cannot wake you up  © 2017 [address] Information is for End User's use only and may not be sold, redistributed or otherwise used for commercial purposes  All illustrations and images included in CareNotes® are the copyrighted property of A D A M [employer] Inc  or Sam Jackson  The above information is an  only  It is not intended as medical advice for individual conditions or treatments   Talk to your doctor, nurse or pharmacist before following any medical regimen to see if it is safe and effective for you

## 2018-02-05 NOTE — ASSESSMENT & PLAN NOTE
Acute chg in mentation x 2 events lasting days -characterized by loss of memory, confusion, unable to recognize family members described is catatonic by wife  Denies palpitations, chest pain, shortness of breath  Denies seizures, focal weakness, headache  No ER workup in  Patient made appointment with [facility] in [facility] and drove to be evaluated for this problem however was not addressed due to insurance issues  However, per pt -was given lactulose as needed for these confusion states in patient reports taking 2 x a day as needed w/ improvement of sxs  No recent new ammonia level  Cardiac etiology is doubtful as he denies any chest pain, palpitations or shortness of breath  Patient has chronic GERD symptoms with chronic nausea last EGD in 2016  Patient needs workup for possible new seizure presentation or worsening liver disease however December labs are stable  Patient given strict instructions to report to ER if these symptoms return - through 911    Ct scan ordered/ labs ordered

## 2018-02-15 LAB
ALBUMIN SERPL-MCNC: 3.9 G/DL (ref 3.6–5.1)
ALBUMIN/GLOB SERPL: 1.6 (CALC) (ref 1–2.5)
ALP SERPL-CCNC: 90 U/L (ref 40–115)
ALT SERPL-CCNC: 17 U/L (ref 9–46)
AMMONIA PLAS-SCNC: 80 UMOL/L
AST SERPL-CCNC: 16 U/L (ref 10–40)
BASOPHILS # BLD AUTO: 27 CELLS/UL (ref 0–200)
BASOPHILS NFR BLD AUTO: 0.4 %
BILIRUB SERPL-MCNC: 1.3 MG/DL (ref 0.2–1.2)
BUN SERPL-MCNC: 21 MG/DL (ref 7–25)
BUN/CREAT SERPL: ABNORMAL (CALC) (ref 6–22)
CALCIUM SERPL-MCNC: 9.3 MG/DL (ref 8.6–10.3)
CHLORIDE SERPL-SCNC: 108 MMOL/L (ref 98–110)
CO2 SERPL-SCNC: 26 MMOL/L (ref 20–31)
CREAT SERPL-MCNC: 1.23 MG/DL (ref 0.6–1.35)
EOSINOPHIL # BLD AUTO: 211 CELLS/UL (ref 15–500)
EOSINOPHIL NFR BLD AUTO: 3.1 %
ERYTHROCYTE [DISTWIDTH] IN BLOOD BY AUTOMATED COUNT: 14.5 % (ref 11–15)
GLOBULIN SER CALC-MCNC: 2.5 G/DL (CALC) (ref 1.9–3.7)
GLUCOSE SERPL-MCNC: 99 MG/DL (ref 65–99)
HCT VFR BLD AUTO: 50 % (ref 38.5–50)
HGB BLD-MCNC: 17.5 G/DL (ref 13.2–17.1)
LYMPHOCYTES # BLD AUTO: 1965 CELLS/UL (ref 850–3900)
LYMPHOCYTES NFR BLD AUTO: 28.9 %
MCH RBC QN AUTO: 30.8 PG (ref 27–33)
MCHC RBC AUTO-ENTMCNC: 35 G/DL (ref 32–36)
MCV RBC AUTO: 87.9 FL (ref 80–100)
MONOCYTES # BLD AUTO: 558 CELLS/UL (ref 200–950)
MONOCYTES NFR BLD AUTO: 8.2 %
NEUTROPHILS # BLD AUTO: 4039 CELLS/UL (ref 1500–7800)
NEUTROPHILS NFR BLD AUTO: 59.4 %
PLATELET # BLD AUTO: 212 THOUSAND/UL (ref 140–400)
PMV BLD REES-ECKER: 9.7 FL (ref 7.5–12.5)
POTASSIUM SERPL-SCNC: 4.4 MMOL/L (ref 3.5–5.3)
PROT SERPL-MCNC: 6.4 G/DL (ref 6.1–8.1)
RBC # BLD AUTO: 5.69 MILLION/UL (ref 4.2–5.8)
SL AMB EGFR AFRICAN AMERICAN: 79 ML/MIN/1.73M2
SL AMB EGFR NON AFRICAN AMERICAN: 69 ML/MIN/1.73M2
SODIUM SERPL-SCNC: 140 MMOL/L (ref 135–146)
TSH SERPL-ACNC: 1.54 MIU/L (ref 0.4–4.5)
WBC # BLD AUTO: 6.8 THOUSAND/UL (ref 3.8–10.8)

## 2018-02-19 ENCOUNTER — TELEPHONE (OUTPATIENT)
Dept: FAMILY MEDICINE CLINIC | Facility: CLINIC | Age: 48
End: 2018-02-19

## 2018-02-20 DIAGNOSIS — K72.90 HE (HEPATIC ENCEPHALOPATHY) (HCC): Primary | ICD-10-CM

## 2018-02-20 NOTE — TELEPHONE ENCOUNTER
Call pt - per his request -  I reordered lactulose 20 gm bid - he needs to re check his ammonia level in one week on this dose  Order was entered

## 2018-03-06 ENCOUNTER — OFFICE VISIT (OUTPATIENT)
Dept: FAMILY MEDICINE CLINIC | Facility: CLINIC | Age: 48
End: 2018-03-06
Payer: COMMERCIAL

## 2018-03-06 VITALS
WEIGHT: 315 LBS | OXYGEN SATURATION: 97 % | HEART RATE: 75 BPM | BODY MASS INDEX: 45.1 KG/M2 | HEIGHT: 70 IN | DIASTOLIC BLOOD PRESSURE: 92 MMHG | TEMPERATURE: 97.8 F | SYSTOLIC BLOOD PRESSURE: 120 MMHG | RESPIRATION RATE: 16 BRPM

## 2018-03-06 DIAGNOSIS — R40.4 TRANSIENT ALTERATION OF AWARENESS: ICD-10-CM

## 2018-03-06 DIAGNOSIS — N18.30 CHRONIC KIDNEY DISEASE, STAGE 3 (HCC): ICD-10-CM

## 2018-03-06 DIAGNOSIS — G93.40 ENCEPHALOPATHY: Primary | ICD-10-CM

## 2018-03-06 PROCEDURE — 99214 OFFICE O/P EST MOD 30 MIN: CPT | Performed by: NURSE PRACTITIONER

## 2018-03-06 RX ORDER — LACTULOSE 20 G/30ML
20 SOLUTION ORAL 2 TIMES DAILY
Qty: 946 ML | Refills: 3 | Status: SHIPPED | OUTPATIENT
Start: 2018-03-06 | End: 2019-11-12 | Stop reason: SDUPTHER

## 2018-03-06 NOTE — PROGRESS NOTES
Assessment/Plan:  S/p Liver transplant w/ acute changes in mentation - ammonia elevated - started lactulose daily - seen by Dr Rae Lilly - deemed stable  Pt agreeing to follow up w/ Dr Rae Lilly every 6 months and [facility] yearly, as they will be managing any progression of liver disease s/p transplant  I need updated labs including ammonia level  Labs every 3 months  Diagnoses and all orders for this visit:    Encephalopathy  -     lactulose 20 g/30 mL; Take 30 mL (20 g total) by mouth 2 (two) times a day  -     Comprehensive metabolic panel; Standing  -     Lipid panel; Future  -     CBC and differential; Future  -     Protime-INR; Future  -     TSH, 3rd generation with T4 reflex; Future  -     Ammonia; Future  -     Comprehensive metabolic panel    Chronic kidney disease, stage 3  -     Lipid panel; Future  -     CBC and differential; Future  -     Protime-INR; Future  -     TSH, 3rd generation with T4 reflex; Future  -     Ammonia; Future    Transient alteration of awareness  -     Lipid panel; Future  -     CBC and differential; Future  -     Protime-INR; Future  -     TSH, 3rd generation with T4 reflex; Future  -     Ammonia; Future          Subjective:      Patient ID: Sabina Alvarez is a 50 y o  male  One month f/u for chg in mentation and inc ammonia level - new start lactulose BID  Seen by Dr Kalee Gonsalves at [facility] - states he is stable  Will f/u w/ him in 6 months  Dr Rae Lilly is requesting him to continue w/ [facility] yearly for post transplant management  He tells me he cannot bc it is not covered  I explained to him if he gets unstable - I will refer him to Dr Rae Lilly to be seen earlier  Pt tells me he only takes lactulose daily d/t cost and will not take it unless it is cheaper - I explained to the pt that I do not control costs of medications - he can call his pharmacy to find out what is the cheapest and let me know, however we can try a bottle instead of cups  Pt reports 3 stools a day    Pt denies bouts of confusion on lactulose daily - he owes me an ammonia  Pt is not interested in weight loss strategies  The following portions of the patient's history were reviewed and updated as appropriate: allergies, current medications, past surgical history and problem list     Review of Systems   Constitutional: Negative  Negative for chills and fever  HENT: Negative  Respiratory: Negative  Cardiovascular: Negative  Gastrointestinal: Negative  Negative for abdominal distention, abdominal pain, blood in stool, constipation, diarrhea and nausea  Musculoskeletal: Negative  Allergic/Immunologic: Negative  Neurological: Positive for tremors  Negative for dizziness, seizures, speech difficulty, weakness and headaches  Psychiatric/Behavioral: Negative  Negative for confusion, decreased concentration, dysphoric mood and sleep disturbance  Objective:  Vitals:    03/06/18 1323   BP: 120/92   Pulse: 75   Resp: 16   Temp: 97 8 °F (36 6 °C)   SpO2: 97%   Weight: (!) 172 kg (379 lb 12 8 oz)   Height: 5' 10 08" (1 78 m)              Physical Exam   Constitutional: He is oriented to person, place, and time  He appears well-developed and well-nourished  No distress  Morbid obesity   HENT:   Right Ear: No decreased hearing is noted  Left Ear: No decreased hearing is noted  Eyes: Conjunctivae are normal  Pupils are equal, round, and reactive to light  Neck: Normal range of motion  Neck supple  Cardiovascular: Normal rate, regular rhythm, normal heart sounds and intact distal pulses  No murmur heard  Pulmonary/Chest: Effort normal and breath sounds normal  No respiratory distress  Abdominal: Bowel sounds are normal  He exhibits no distension  There is no tenderness  Neurological: He is alert and oriented to person, place, and time  He has normal reflexes  No cranial nerve deficit  Skin: Skin is warm and dry  Psychiatric: He has a normal mood and affect   His behavior is normal

## 2018-03-06 NOTE — PATIENT INSTRUCTIONS
See neva every 6 months  Follow w/ [facility] yearly  Labs every 3 months  Next draw is next week

## 2018-04-19 LAB
ALBUMIN SERPL-MCNC: 3.7 G/DL (ref 3.6–5.1)
ALBUMIN/GLOB SERPL: 1.6 (CALC) (ref 1–2.5)
ALP SERPL-CCNC: 79 U/L (ref 40–115)
ALT SERPL-CCNC: 14 U/L (ref 9–46)
AMMONIA PLAS-SCNC: 99 UMOL/L
AST SERPL-CCNC: 15 U/L (ref 10–40)
BASOPHILS # BLD AUTO: 19 CELLS/UL (ref 0–200)
BASOPHILS NFR BLD AUTO: 0.3 %
BILIRUB SERPL-MCNC: 1.5 MG/DL (ref 0.2–1.2)
BUN SERPL-MCNC: 25 MG/DL (ref 7–25)
BUN/CREAT SERPL: ABNORMAL (CALC) (ref 6–22)
CALCIUM SERPL-MCNC: 8.8 MG/DL (ref 8.6–10.3)
CHLORIDE SERPL-SCNC: 110 MMOL/L (ref 98–110)
CHOLEST SERPL-MCNC: 123 MG/DL
CHOLEST/HDLC SERPL: 3.1 (CALC)
CO2 SERPL-SCNC: 23 MMOL/L (ref 20–31)
CREAT SERPL-MCNC: 1.19 MG/DL (ref 0.6–1.35)
EOSINOPHIL # BLD AUTO: 208 CELLS/UL (ref 15–500)
EOSINOPHIL NFR BLD AUTO: 3.3 %
ERYTHROCYTE [DISTWIDTH] IN BLOOD BY AUTOMATED COUNT: 13.9 % (ref 11–15)
GLOBULIN SER CALC-MCNC: 2.3 G/DL (CALC) (ref 1.9–3.7)
GLUCOSE SERPL-MCNC: 86 MG/DL (ref 65–99)
HCT VFR BLD AUTO: 46.9 % (ref 38.5–50)
HDLC SERPL-MCNC: 40 MG/DL
HGB BLD-MCNC: 16.6 G/DL (ref 13.2–17.1)
INR PPP: 1.1
LDLC SERPL CALC-MCNC: 68 MG/DL (CALC)
LYMPHOCYTES # BLD AUTO: 1884 CELLS/UL (ref 850–3900)
LYMPHOCYTES NFR BLD AUTO: 29.9 %
MCH RBC QN AUTO: 31.1 PG (ref 27–33)
MCHC RBC AUTO-ENTMCNC: 35.4 G/DL (ref 32–36)
MCV RBC AUTO: 88 FL (ref 80–100)
MONOCYTES # BLD AUTO: 554 CELLS/UL (ref 200–950)
MONOCYTES NFR BLD AUTO: 8.8 %
NEUTROPHILS # BLD AUTO: 3635 CELLS/UL (ref 1500–7800)
NEUTROPHILS NFR BLD AUTO: 57.7 %
NONHDLC SERPL-MCNC: 83 MG/DL (CALC)
PLATELET # BLD AUTO: 189 THOUSAND/UL (ref 140–400)
PMV BLD REES-ECKER: 9.8 FL (ref 7.5–12.5)
POTASSIUM SERPL-SCNC: 4.2 MMOL/L (ref 3.5–5.3)
PROT SERPL-MCNC: 6 G/DL (ref 6.1–8.1)
PROTHROMBIN TIME: 11.2 SEC (ref 9–11.5)
RBC # BLD AUTO: 5.33 MILLION/UL (ref 4.2–5.8)
SL AMB EGFR AFRICAN AMERICAN: 83 ML/MIN/1.73M2
SL AMB EGFR NON AFRICAN AMERICAN: 71 ML/MIN/1.73M2
SODIUM SERPL-SCNC: 142 MMOL/L (ref 135–146)
TRIGL SERPL-MCNC: 71 MG/DL
TSH SERPL-ACNC: 1.47 MIU/L (ref 0.4–4.5)
WBC # BLD AUTO: 6.3 THOUSAND/UL (ref 3.8–10.8)

## 2018-04-20 NOTE — PROGRESS NOTES
Please mail copy to pt/ and call him/fax to his GI doctor (look in care now) - ammonia is elevated - confirm dose of lactulose as well

## 2018-06-03 DIAGNOSIS — Z94.4 LIVER TRANSPLANTED (HCC): ICD-10-CM

## 2018-06-03 DIAGNOSIS — I10 ESSENTIAL HYPERTENSION: ICD-10-CM

## 2018-06-04 RX ORDER — FUROSEMIDE 40 MG/1
TABLET ORAL
Qty: 30 TABLET | Refills: 3 | Status: SHIPPED | OUTPATIENT
Start: 2018-06-04 | End: 2018-09-10 | Stop reason: SDUPTHER

## 2018-07-12 DIAGNOSIS — I10 ESSENTIAL HYPERTENSION: ICD-10-CM

## 2018-07-12 RX ORDER — AMLODIPINE BESYLATE 5 MG/1
TABLET ORAL
Qty: 60 TABLET | Refills: 3 | Status: SHIPPED | OUTPATIENT
Start: 2018-07-12 | End: 2018-10-24 | Stop reason: SDUPTHER

## 2018-07-12 RX ORDER — METOPROLOL SUCCINATE 25 MG/1
TABLET, EXTENDED RELEASE ORAL
Qty: 60 TABLET | Refills: 3 | Status: SHIPPED | OUTPATIENT
Start: 2018-07-12 | End: 2019-10-02 | Stop reason: SDUPTHER

## 2018-08-01 DIAGNOSIS — K21.9 GASTROESOPHAGEAL REFLUX DISEASE, ESOPHAGITIS PRESENCE NOT SPECIFIED: ICD-10-CM

## 2018-08-02 RX ORDER — OMEPRAZOLE 20 MG/1
20 CAPSULE, DELAYED RELEASE ORAL DAILY
Qty: 90 CAPSULE | Refills: 1 | Status: SHIPPED | OUTPATIENT
Start: 2018-08-02 | End: 2018-10-03 | Stop reason: SDUPTHER

## 2018-08-20 ENCOUNTER — TELEPHONE (OUTPATIENT)
Dept: FAMILY MEDICINE CLINIC | Facility: CLINIC | Age: 48
End: 2018-08-20

## 2018-08-20 DIAGNOSIS — Z94.89 TRANSPLANT RECIPIENT: Primary | ICD-10-CM

## 2018-08-20 NOTE — TELEPHONE ENCOUNTER
Pt is asking for lab order for his transplant team located in [place] and he said that you normally order it    Pt also that you add the PT

## 2018-08-27 ENCOUNTER — OFFICE VISIT (OUTPATIENT)
Dept: FAMILY MEDICINE CLINIC | Facility: CLINIC | Age: 48
End: 2018-08-27
Payer: COMMERCIAL

## 2018-08-27 VITALS
OXYGEN SATURATION: 97 % | BODY MASS INDEX: 46.65 KG/M2 | DIASTOLIC BLOOD PRESSURE: 80 MMHG | HEART RATE: 79 BPM | RESPIRATION RATE: 16 BRPM | WEIGHT: 315 LBS | HEIGHT: 69 IN | TEMPERATURE: 97.6 F | SYSTOLIC BLOOD PRESSURE: 120 MMHG

## 2018-08-27 DIAGNOSIS — K21.9 GERD WITHOUT ESOPHAGITIS: ICD-10-CM

## 2018-08-27 DIAGNOSIS — IMO0001 CLASS 3 OBESITY WITH SERIOUS COMORBIDITY AND BODY MASS INDEX (BMI) OF 50.0 TO 59.9 IN ADULT, UNSPECIFIED OBESITY TYPE: ICD-10-CM

## 2018-08-27 DIAGNOSIS — N18.30 CHRONIC KIDNEY DISEASE, STAGE 3 (HCC): ICD-10-CM

## 2018-08-27 DIAGNOSIS — Z98.84 H/O GASTRIC BYPASS: ICD-10-CM

## 2018-08-27 DIAGNOSIS — I10 ESSENTIAL HYPERTENSION: Primary | ICD-10-CM

## 2018-08-27 DIAGNOSIS — Z23 NEED FOR INFLUENZA VACCINATION: ICD-10-CM

## 2018-08-27 DIAGNOSIS — Z94.4 LIVER TRANSPLANTED (HCC): ICD-10-CM

## 2018-08-27 LAB
ALBUMIN SERPL-MCNC: 4 G/DL (ref 3.6–5.1)
ALBUMIN/GLOB SERPL: 1.7 (CALC) (ref 1–2.5)
ALP SERPL-CCNC: 83 U/L (ref 40–115)
ALT SERPL-CCNC: 15 U/L (ref 9–46)
APTT PPP: 32 SEC (ref 22–34)
AST SERPL-CCNC: 17 U/L (ref 10–40)
BILIRUB SERPL-MCNC: 1.3 MG/DL (ref 0.2–1.2)
BUN SERPL-MCNC: 27 MG/DL (ref 7–25)
BUN/CREAT SERPL: 18 (CALC) (ref 6–22)
CALCIUM SERPL-MCNC: 9.2 MG/DL (ref 8.6–10.3)
CHLORIDE SERPL-SCNC: 105 MMOL/L (ref 98–110)
CO2 SERPL-SCNC: 24 MMOL/L (ref 20–32)
CREAT SERPL-MCNC: 1.5 MG/DL (ref 0.6–1.35)
GLOBULIN SER CALC-MCNC: 2.3 G/DL (CALC) (ref 1.9–3.7)
GLUCOSE SERPL-MCNC: 105 MG/DL (ref 65–99)
HBA1C MFR BLD: 5.2 % OF TOTAL HGB
POTASSIUM SERPL-SCNC: 4.3 MMOL/L (ref 3.5–5.3)
PROT SERPL-MCNC: 6.3 G/DL (ref 6.1–8.1)
SL AMB EGFR AFRICAN AMERICAN: 62 ML/MIN/1.73M2
SL AMB EGFR NON AFRICAN AMERICAN: 54 ML/MIN/1.73M2
SODIUM SERPL-SCNC: 139 MMOL/L (ref 135–146)
TACROLIMUS BLD-MCNC: 6.5 MCG/L

## 2018-08-27 PROCEDURE — 3079F DIAST BP 80-89 MM HG: CPT | Performed by: NURSE PRACTITIONER

## 2018-08-27 PROCEDURE — 90682 RIV4 VACC RECOMBINANT DNA IM: CPT

## 2018-08-27 PROCEDURE — 1036F TOBACCO NON-USER: CPT | Performed by: NURSE PRACTITIONER

## 2018-08-27 PROCEDURE — 3008F BODY MASS INDEX DOCD: CPT | Performed by: NURSE PRACTITIONER

## 2018-08-27 PROCEDURE — 3074F SYST BP LT 130 MM HG: CPT | Performed by: NURSE PRACTITIONER

## 2018-08-27 PROCEDURE — 99214 OFFICE O/P EST MOD 30 MIN: CPT | Performed by: NURSE PRACTITIONER

## 2018-08-27 RX ORDER — METHION/INOS/CHOL BT/B COM/LIV 110MG-86MG
100 CAPSULE ORAL DAILY
Qty: 90 TABLET | Refills: 3 | Status: SHIPPED | OUTPATIENT
Start: 2018-08-27

## 2018-08-27 RX ORDER — FAMOTIDINE 20 MG
1 TABLET ORAL DAILY
Qty: 90 CAPSULE | Refills: 3 | Status: SHIPPED | OUTPATIENT
Start: 2018-08-27

## 2018-08-27 RX ORDER — CHOLECALCIFEROL (VITAMIN D3) 25 MCG
1 TABLET,CHEWABLE ORAL DAILY
Qty: 90 CAPSULE | Refills: 3 | Status: SHIPPED | OUTPATIENT
Start: 2018-08-27

## 2018-08-27 NOTE — PROGRESS NOTES
Assessment/Plan:    No problem-specific Assessment & Plan notes found for this encounter  Diagnoses and all orders for this visit:    Essential hypertension  -     Lipid panel; Standing  -     CBC and differential; Standing  -     Lipid panel  -     CBC and differential    GERD without esophagitis  -     Comprehensive metabolic panel; Standing  -     Tacrolimus level; Standing  -     Lipid panel; Standing  -     CBC and differential; Standing  -     Comprehensive metabolic panel  -     Tacrolimus level  -     Lipid panel  -     CBC and differential    Chronic kidney disease, stage 3  -     Comprehensive metabolic panel; Standing  -     Tacrolimus level; Standing  -     Lipid panel; Standing  -     CBC and differential; Standing  -     Comprehensive metabolic panel  -     Tacrolimus level  -     Lipid panel  -     CBC and differential    Class 3 obesity with serious comorbidity and body mass index (BMI) of 50 0 to 59 9 in adult, unspecified obesity type (HCC)    Need for influenza vaccination  -     influenza vaccine, 3086-2009, quadrivalent, recombinant, PF, 0 5 mL, for patients 18 yr+ (FLUBLOK)    H/O gastric bypass  -     Vitamin B1 (Thiamine), Serum/Plasma, LC/MS/MS; Future  -     Vitamin D 25 hydroxy; Future  -     Vitamin B12; Future  -     Thiamine HCl (VITAMIN B-1) 100 MG TABS; Take 1 tablet (100 mg total) by mouth daily  -     Cyanocobalamin (B-12) 1000 MCG CAPS; Take 1 capsule (1,000 mcg total) by mouth daily  -     Vitamin D, Cholecalciferol, 1000 units CAPS; Take 1 tablet (1,000 Units total) by mouth daily  -     calcium carbonate-vitamin D (OSCAL-D) 500 mg-200 units per tablet; Take 1 tablet by mouth daily    Liver transplanted ([facility] [address] )  -     Comprehensive metabolic panel; Standing  -     Tacrolimus level; Standing  -     Lipid panel; Standing  -     Protime (PT) and Partial Thromboplastin Time (PTT);  Standing  -     CBC and differential; Standing  -     Ammonia; Standing  -     Comprehensive metabolic panel  -     Tacrolimus level  -     Lipid panel  -     Protime (PT) and Partial Thromboplastin Time (PTT)  -     CBC and differential  -     Ammonia          Subjective:   Chief Complaint   Patient presents with    Follow-up     6 month    Labs done 8/25/18  HM: PHQ 9 Pneumo 23   Patient given the phq9        Patient ID: Bouchra Rogers is a 50 y o  male  HPI  Six-month follow-up with labs -patient is status post liver transplant d/t [facility] - under the care of Dr Yahaira Haney in [place] -office visit yearly however I order his labs -for insurance purposes  Morbid obesity noted however patient recently lost 10 lb  Mild chronic kidney disease-2/3a with a GFR 54 creatinine baseline 1 19-1 52 with a recent  creatinine at 1 50  Denies taking NSAIDS - uses tylenol  Med list verified - takes lactulose as needed - usually 3 times a week for mental fogginess  Last seen Dr Tim Fleming in April  Pt is s/p gastric bypass - done years ago in [facility] -not on any vitamins  Tremors subsided  PHQ-9 done - neg    The following portions of the patient's history were reviewed and updated as appropriate: allergies, past social history, past surgical history and problem list     Review of Systems   Constitutional: Negative  Negative for chills and fever  HENT: Negative  Respiratory: Negative  Negative for cough and shortness of breath  Cardiovascular: Negative  Negative for chest pain and leg swelling  Gastrointestinal: Negative  Negative for abdominal distention, abdominal pain, blood in stool, constipation, diarrhea and nausea  Musculoskeletal: Negative  Allergic/Immunologic: Negative  Neurological: Negative for dizziness, tremors, seizures, speech difficulty, weakness, light-headedness, numbness and headaches  Psychiatric/Behavioral: Negative  Negative for confusion, decreased concentration, dysphoric mood and sleep disturbance         LABS:  Tacrolimus level pending  CMP: Glucose 105, BUN 27, creatinine 1 50 (baseline creatinine 1 19-1  52), GFR 54; LFTs normal limits; bilirubin 1 3  PTT 32  A1c 5 2    /80 (BP Location: Left arm, Patient Position: Sitting, Cuff Size: Large)   Pulse 79   Temp 97 6 °F (36 4 °C) (Oral)   Resp 16   Ht 5' 9 29" (1 76 m)   Wt (!) 167 kg (369 lb)   SpO2 97%   BMI 54 04 kg/m²          Physical Exam   Constitutional: He is oriented to person, place, and time  He appears well-developed and well-nourished  No distress  Morbid obesity   HENT:   Head: Normocephalic  Right Ear: Tympanic membrane normal  No decreased hearing is noted  Left Ear: Tympanic membrane normal  No decreased hearing is noted  Nose: Nose normal    Mouth/Throat: Uvula is midline, oropharynx is clear and moist and mucous membranes are normal    Eyes: Conjunctivae are normal  Pupils are equal, round, and reactive to light  Neck: Normal range of motion  Neck supple  Cardiovascular: Normal rate, regular rhythm, normal heart sounds and intact distal pulses  No murmur heard  Pulmonary/Chest: Effort normal and breath sounds normal  No respiratory distress  Abdominal: Bowel sounds are normal  He exhibits no distension  There is no tenderness  There is no rebound  Musculoskeletal: Normal range of motion  He exhibits no edema  Neurological: He is alert and oriented to person, place, and time  He has normal reflexes  No cranial nerve deficit  Coordination normal    Skin: Skin is warm and dry  Psychiatric: He has a normal mood and affect   His behavior is normal

## 2018-09-04 ENCOUNTER — TELEPHONE (OUTPATIENT)
Dept: PAIN MEDICINE | Facility: MEDICAL CENTER | Age: 48
End: 2018-09-04

## 2018-09-10 DIAGNOSIS — I10 ESSENTIAL HYPERTENSION: ICD-10-CM

## 2018-09-10 DIAGNOSIS — Z94.4 LIVER TRANSPLANTED (HCC): ICD-10-CM

## 2018-09-10 RX ORDER — FUROSEMIDE 40 MG/1
TABLET ORAL
Qty: 90 TABLET | Refills: 1 | Status: SHIPPED | OUTPATIENT
Start: 2018-09-10 | End: 2019-02-07 | Stop reason: SDUPTHER

## 2018-10-03 DIAGNOSIS — K21.9 GASTROESOPHAGEAL REFLUX DISEASE, ESOPHAGITIS PRESENCE NOT SPECIFIED: ICD-10-CM

## 2018-10-03 NOTE — TELEPHONE ENCOUNTER
Omeprazole 20mg is not paid for by insurance but they will pay for Pantoprazole 40mg      [address] , [place]

## 2018-10-04 RX ORDER — OMEPRAZOLE 20 MG/1
20 CAPSULE, DELAYED RELEASE ORAL DAILY
Qty: 90 CAPSULE | Refills: 1 | Status: SHIPPED | OUTPATIENT
Start: 2018-10-04 | End: 2019-09-10

## 2018-10-24 DIAGNOSIS — I10 ESSENTIAL HYPERTENSION: ICD-10-CM

## 2018-10-25 RX ORDER — AMLODIPINE BESYLATE 5 MG/1
TABLET ORAL
Qty: 60 TABLET | Refills: 0 | Status: SHIPPED | OUTPATIENT
Start: 2018-10-25 | End: 2018-11-19 | Stop reason: SDUPTHER

## 2018-11-19 DIAGNOSIS — I10 ESSENTIAL HYPERTENSION: ICD-10-CM

## 2018-11-19 RX ORDER — AMLODIPINE BESYLATE 5 MG/1
TABLET ORAL
Qty: 60 TABLET | Refills: 5 | Status: SHIPPED | OUTPATIENT
Start: 2018-11-19 | End: 2019-05-10 | Stop reason: SDUPTHER

## 2019-02-07 DIAGNOSIS — I10 ESSENTIAL HYPERTENSION: ICD-10-CM

## 2019-02-07 DIAGNOSIS — Z94.4 LIVER TRANSPLANTED (HCC): ICD-10-CM

## 2019-02-07 RX ORDER — FUROSEMIDE 40 MG/1
TABLET ORAL
Qty: 90 TABLET | Refills: 1 | Status: SHIPPED | OUTPATIENT
Start: 2019-02-07 | End: 2019-07-20 | Stop reason: SDUPTHER

## 2019-02-14 LAB
25(OH)D3 SERPL-MCNC: 50 NG/ML (ref 30–100)
ALBUMIN SERPL-MCNC: 3.8 G/DL (ref 3.6–5.1)
ALBUMIN/GLOB SERPL: 1.7 (CALC) (ref 1–2.5)
ALP SERPL-CCNC: 72 U/L (ref 40–115)
ALT SERPL-CCNC: 14 U/L (ref 9–46)
AMMONIA PLAS-SCNC: 102 UMOL/L
APTT PPP: 28 SEC (ref 22–34)
AST SERPL-CCNC: 14 U/L (ref 10–35)
BASOPHILS # BLD AUTO: 21 CELLS/UL (ref 0–200)
BASOPHILS NFR BLD AUTO: 0.3 %
BILIRUB SERPL-MCNC: 1.2 MG/DL (ref 0.2–1.2)
BUN SERPL-MCNC: 21 MG/DL (ref 7–25)
BUN/CREAT SERPL: ABNORMAL (CALC) (ref 6–22)
CALCIUM SERPL-MCNC: 8.7 MG/DL (ref 8.6–10.3)
CHLORIDE SERPL-SCNC: 110 MMOL/L (ref 98–110)
CHOLEST SERPL-MCNC: 139 MG/DL
CHOLEST/HDLC SERPL: 3.5 (CALC)
CO2 SERPL-SCNC: 23 MMOL/L (ref 20–32)
CREAT SERPL-MCNC: 1.19 MG/DL (ref 0.7–1.33)
EOSINOPHIL # BLD AUTO: 199 CELLS/UL (ref 15–500)
EOSINOPHIL NFR BLD AUTO: 2.8 %
ERYTHROCYTE [DISTWIDTH] IN BLOOD BY AUTOMATED COUNT: 13.3 % (ref 11–15)
GLOBULIN SER CALC-MCNC: 2.2 G/DL (CALC) (ref 1.9–3.7)
GLUCOSE SERPL-MCNC: 103 MG/DL (ref 65–99)
HCT VFR BLD AUTO: 48.1 % (ref 38.5–50)
HDLC SERPL-MCNC: 40 MG/DL
HGB BLD-MCNC: 16.9 G/DL (ref 13.2–17.1)
INR PPP: 1.1
LDLC SERPL CALC-MCNC: 82 MG/DL (CALC)
LYMPHOCYTES # BLD AUTO: 2237 CELLS/UL (ref 850–3900)
LYMPHOCYTES NFR BLD AUTO: 31.5 %
MCH RBC QN AUTO: 31.1 PG (ref 27–33)
MCHC RBC AUTO-ENTMCNC: 35.1 G/DL (ref 32–36)
MCV RBC AUTO: 88.6 FL (ref 80–100)
MONOCYTES # BLD AUTO: 710 CELLS/UL (ref 200–950)
MONOCYTES NFR BLD AUTO: 10 %
NEUTROPHILS # BLD AUTO: 3933 CELLS/UL (ref 1500–7800)
NEUTROPHILS NFR BLD AUTO: 55.4 %
NONHDLC SERPL-MCNC: 99 MG/DL (CALC)
PLATELET # BLD AUTO: 191 THOUSAND/UL (ref 140–400)
PMV BLD REES-ECKER: 9.9 FL (ref 7.5–12.5)
POTASSIUM SERPL-SCNC: 4.1 MMOL/L (ref 3.5–5.3)
PROT SERPL-MCNC: 6 G/DL (ref 6.1–8.1)
PROTHROMBIN TIME: 11.6 SEC (ref 9–11.5)
RBC # BLD AUTO: 5.43 MILLION/UL (ref 4.2–5.8)
SL AMB EGFR AFRICAN AMERICAN: 82 ML/MIN/1.73M2
SL AMB EGFR NON AFRICAN AMERICAN: 71 ML/MIN/1.73M2
SODIUM SERPL-SCNC: 142 MMOL/L (ref 135–146)
TACROLIMUS BLD-MCNC: 12 MCG/L
TRIGL SERPL-MCNC: 88 MG/DL
VIT B1 BLD-SCNC: 145 NMOL/L (ref 78–185)
VIT B12 SERPL-MCNC: >2000 PG/ML (ref 200–1100)
WBC # BLD AUTO: 7.1 THOUSAND/UL (ref 3.8–10.8)

## 2019-02-27 NOTE — PROGRESS NOTES
Assessment/Plan:    Liver transplant recipient [facility])  Hx of liver transplant - He was listed at [facility], but ultimately was transplanted at [facility] in [facility]  He had been followed by Dr Carlton Rivera followed him for several years post transplant in [place]  He was transplanted in 2010  Prior to transplant, he had a lot of HE  Labs are reoccurring every 3 mos through myself [facility] reviews on care every where  This is the least expensive method  He has been stable since Dec 2017 -  had bouts of moments of fogginess - he takes as needed lactulose  Stools are 3-4 times a day  Ascites 4/28/2010    Cirrhosis  Fatty liver,    Liver Disease   Morbid Obesity - s/p gastric bypass - gained 50 lbs on prograf/ chg in diet d/t liver dz   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    Renal insufficiency 4/28/2010 : creat has improved   S/P OLT @ [place] in [facility] 8/1/2010         Chronic kidney disease, stage 3  Baseline 1 22-1 52-however creatinine currently down to 1 19 patient reports drinking more water  Hypertension  Stable on current regimen:  Amlodipine 5 mg daily, metoprolol 25 mg succinate twice daily, and furosemide 40 daily  Diagnoses and all orders for this visit:    Essential hypertension    Chronic kidney disease, stage 3 (HCC)    Class 3 severe obesity with serious comorbidity and body mass index (BMI) of 50 0 to 59 9 in adult, unspecified obesity type ([facility] [address] )    H/O gastric bypass    GERD without esophagitis    Screen for colon cancer  -     Ambulatory referral to Gastroenterology; Future    Need for pneumococcal vaccination  -     PNEUMOCOCCAL POLYSACCHARIDE VACCINE 23-VALENT =>1YO SQ IM    Liver transplant recipient [facility])    Other orders  -     Discontinue: metoprolol tartrate (LOPRESSOR) 25 mg tablet;  Take 25 mg by mouth          Subjective: 6 month follow up  - labs done 02/09/19           Health Maintenance Due   Topic Date Due    CRC Screening: Colonoscopy  1970    BMI: Followup Plan  09/17/1986    Pneumococcal PPSV23 Highest Risk Adult (1 of 3 - PCV13) 09/17/1987          Patient ID: Reji Thurston is a 46 y o  male  HPI  Chronic disease follow-up as noted  Lab review done  Creatinine is improved-patient admits to increased water intake  Weight is 369-379 # - patient currently is at 5 - he is not interested in any weight loss strategies - states that he does what he can and that since his liver transplant and being on Prograf he has gained weight  He reports stools 3 to 4 times a day  He also reports as needed lactulose when he gets foggy  He has not seen Dr Gigi Fraga since 04/20/2018  He is out of work  His wife does work - however she will be out of work soon - "is being let go"  Barriers to care are finances  Patient receives labs every 3 months through this provider  The following portions of the patient's history were reviewed and updated as appropriate: allergies, past social history, past surgical history and problem list     Review of Systems   Constitutional: Negative  Negative for chills and fever  HENT: Negative  Respiratory: Negative  Negative for cough and shortness of breath  Cardiovascular: Negative  Negative for chest pain and leg swelling  Gastrointestinal: Negative  Negative for abdominal distention, abdominal pain, blood in stool, constipation, diarrhea and nausea  Musculoskeletal: Negative  Allergic/Immunologic: Negative  Neurological: Negative for dizziness, tremors, seizures, speech difficulty, weakness, light-headedness, numbness and headaches  Psychiatric/Behavioral: Negative  Negative for confusion, decreased concentration, dysphoric mood and sleep disturbance           Objective:      /82 (BP Location: Left arm, Patient Position: Sitting, Cuff Size: Large)   Pulse 80   Temp 98 3 °F (36 8 °C) (Tympanic)   Resp 18   Ht 5' 9" (1 753 m)   Wt (!) 170 kg (374 lb 3 2 oz)   SpO2 98%   BMI 55 26 kg/m² Physical Exam   Constitutional: He is oriented to person, place, and time  He appears well-developed and well-nourished  No distress  Morbid obesity   HENT:   Head: Normocephalic  Right Ear: Tympanic membrane normal  No decreased hearing is noted  Left Ear: Tympanic membrane normal  No decreased hearing is noted  Nose: Nose normal    Mouth/Throat: Uvula is midline, oropharynx is clear and moist and mucous membranes are normal    Eyes: Pupils are equal, round, and reactive to light  Conjunctivae are normal    Neck: Normal range of motion  Neck supple  Cardiovascular: Normal rate, regular rhythm, normal heart sounds and intact distal pulses  No murmur heard  Pulmonary/Chest: Effort normal and breath sounds normal  No respiratory distress  Abdominal: Bowel sounds are normal  He exhibits no distension  There is no tenderness  There is no rebound  Musculoskeletal: Normal range of motion  He exhibits no edema  Neurological: He is alert and oriented to person, place, and time  He has normal reflexes  No cranial nerve deficit  Coordination normal    Skin: Skin is warm and dry  Psychiatric: He has a normal mood and affect  His behavior is normal        BMI Counseling: Body mass index is 55 26 kg/m²  Discussed the patient's BMI with him  The BMI is above average  BMI counseling and education was provided to the patient  Exercise recommendations include exercising 3-5 times per week

## 2019-02-28 ENCOUNTER — OFFICE VISIT (OUTPATIENT)
Dept: FAMILY MEDICINE CLINIC | Facility: CLINIC | Age: 49
End: 2019-02-28
Payer: COMMERCIAL

## 2019-02-28 VITALS
WEIGHT: 315 LBS | OXYGEN SATURATION: 98 % | TEMPERATURE: 98.3 F | HEIGHT: 69 IN | DIASTOLIC BLOOD PRESSURE: 82 MMHG | HEART RATE: 80 BPM | RESPIRATION RATE: 18 BRPM | SYSTOLIC BLOOD PRESSURE: 122 MMHG | BODY MASS INDEX: 46.65 KG/M2

## 2019-02-28 DIAGNOSIS — Z94.4 LIVER TRANSPLANT RECIPIENT (HCC): ICD-10-CM

## 2019-02-28 DIAGNOSIS — N18.30 CHRONIC KIDNEY DISEASE, STAGE 3 (HCC): ICD-10-CM

## 2019-02-28 DIAGNOSIS — Z12.11 SCREEN FOR COLON CANCER: ICD-10-CM

## 2019-02-28 DIAGNOSIS — Z23 NEED FOR PNEUMOCOCCAL VACCINATION: ICD-10-CM

## 2019-02-28 DIAGNOSIS — E66.01 CLASS 3 SEVERE OBESITY WITH SERIOUS COMORBIDITY AND BODY MASS INDEX (BMI) OF 50.0 TO 59.9 IN ADULT, UNSPECIFIED OBESITY TYPE (HCC): ICD-10-CM

## 2019-02-28 DIAGNOSIS — Z98.84 H/O GASTRIC BYPASS: ICD-10-CM

## 2019-02-28 DIAGNOSIS — K21.9 GERD WITHOUT ESOPHAGITIS: ICD-10-CM

## 2019-02-28 DIAGNOSIS — I10 ESSENTIAL HYPERTENSION: Primary | ICD-10-CM

## 2019-02-28 PROBLEM — R40.4 TRANSIENT ALTERATION OF AWARENESS: Status: RESOLVED | Noted: 2018-02-05 | Resolved: 2019-02-28

## 2019-02-28 PROCEDURE — 99214 OFFICE O/P EST MOD 30 MIN: CPT | Performed by: NURSE PRACTITIONER

## 2019-02-28 PROCEDURE — 90471 IMMUNIZATION ADMIN: CPT

## 2019-02-28 PROCEDURE — 1036F TOBACCO NON-USER: CPT | Performed by: NURSE PRACTITIONER

## 2019-02-28 PROCEDURE — 3079F DIAST BP 80-89 MM HG: CPT | Performed by: NURSE PRACTITIONER

## 2019-02-28 PROCEDURE — 3074F SYST BP LT 130 MM HG: CPT | Performed by: NURSE PRACTITIONER

## 2019-02-28 PROCEDURE — 3008F BODY MASS INDEX DOCD: CPT | Performed by: NURSE PRACTITIONER

## 2019-02-28 PROCEDURE — 90732 PPSV23 VACC 2 YRS+ SUBQ/IM: CPT

## 2019-02-28 NOTE — ASSESSMENT & PLAN NOTE
Stable on current regimen:  Amlodipine 5 mg daily, metoprolol 25 mg succinate twice daily, and furosemide 40 daily

## 2019-02-28 NOTE — ASSESSMENT & PLAN NOTE
Hx of liver transplant - He was listed at [facility], but ultimately was transplanted at [facility] in [place]  He had been followed by Dr Daniella Rivera followed him for several years post transplant in [place]  He was transplanted in 2010  Prior to transplant, he had a lot of HE  Labs are reoccurring every 3 mos through myself [facility] reviews on care every where  This is the least expensive method  He has been stable since Dec 2017 -  had bouts of moments of fogginess - he takes as needed lactulose  Stools are 3-4 times a day  Ascites 4/28/2010    Cirrhosis  Fatty liver,    Liver Disease   Morbid Obesity - s/p gastric bypass - gained 50 lbs on prograf/ chg in diet d/t liver dz   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

## 2019-04-01 ENCOUNTER — TELEPHONE (OUTPATIENT)
Dept: FAMILY MEDICINE CLINIC | Facility: CLINIC | Age: 49
End: 2019-04-01

## 2019-04-01 DIAGNOSIS — G93.49 OTHER ENCEPHALOPATHY: Primary | ICD-10-CM

## 2019-04-01 DIAGNOSIS — Z94.4 LIVER TRANSPLANT RECIPIENT (HCC): ICD-10-CM

## 2019-04-02 ENCOUNTER — PATIENT OUTREACH (OUTPATIENT)
Dept: FAMILY MEDICINE CLINIC | Facility: CLINIC | Age: 49
End: 2019-04-02

## 2019-05-10 DIAGNOSIS — I10 ESSENTIAL HYPERTENSION: ICD-10-CM

## 2019-05-10 RX ORDER — AMLODIPINE BESYLATE 5 MG/1
TABLET ORAL
Qty: 60 TABLET | Refills: 5 | Status: SHIPPED | OUTPATIENT
Start: 2019-05-10 | End: 2019-10-02 | Stop reason: SDUPTHER

## 2019-07-20 DIAGNOSIS — Z94.4 LIVER TRANSPLANTED (HCC): ICD-10-CM

## 2019-07-20 DIAGNOSIS — I10 ESSENTIAL HYPERTENSION: ICD-10-CM

## 2019-07-22 RX ORDER — FUROSEMIDE 40 MG/1
TABLET ORAL
Qty: 90 TABLET | Refills: 1 | Status: SHIPPED | OUTPATIENT
Start: 2019-07-22 | End: 2019-10-02

## 2019-09-10 ENCOUNTER — OFFICE VISIT (OUTPATIENT)
Dept: FAMILY MEDICINE CLINIC | Facility: CLINIC | Age: 49
End: 2019-09-10
Payer: COMMERCIAL

## 2019-09-10 ENCOUNTER — HOSPITAL ENCOUNTER (EMERGENCY)
Facility: HOSPITAL | Age: 49
Discharge: HOME | End: 2019-09-11
Attending: EMERGENCY MEDICINE | Admitting: EMERGENCY MEDICINE
Payer: COMMERCIAL

## 2019-09-10 ENCOUNTER — APPOINTMENT (EMERGENCY)
Dept: CT IMAGING | Facility: HOSPITAL | Age: 49
End: 2019-09-10
Payer: COMMERCIAL

## 2019-09-10 VITALS
TEMPERATURE: 98.4 F | RESPIRATION RATE: 16 BRPM | OXYGEN SATURATION: 98 % | HEART RATE: 60 BPM | SYSTOLIC BLOOD PRESSURE: 140 MMHG | DIASTOLIC BLOOD PRESSURE: 91 MMHG

## 2019-09-10 VITALS
DIASTOLIC BLOOD PRESSURE: 90 MMHG | RESPIRATION RATE: 16 BRPM | HEART RATE: 75 BPM | SYSTOLIC BLOOD PRESSURE: 130 MMHG | OXYGEN SATURATION: 98 % | TEMPERATURE: 98.2 F | HEIGHT: 69 IN | WEIGHT: 315 LBS | BODY MASS INDEX: 46.65 KG/M2

## 2019-09-10 DIAGNOSIS — R10.2 PELVIC PAIN: ICD-10-CM

## 2019-09-10 DIAGNOSIS — R33.9 URINARY RETENTION: Primary | ICD-10-CM

## 2019-09-10 DIAGNOSIS — R10.819 LEFT FLANK TENDERNESS: ICD-10-CM

## 2019-09-10 DIAGNOSIS — R34 OLIGURIA: Primary | ICD-10-CM

## 2019-09-10 LAB
ALBUMIN SERPL BCP-MCNC: 3.5 G/DL (ref 3.5–5)
ALP SERPL-CCNC: 91 U/L (ref 46–116)
ALT SERPL W P-5'-P-CCNC: 20 U/L (ref 12–78)
AMMONIA PLAS-SCNC: 81 UMOL/L (ref 11–35)
ANION GAP SERPL CALCULATED.3IONS-SCNC: 10 MMOL/L (ref 4–13)
APTT PPP: 29 SECONDS (ref 23–37)
AST SERPL W P-5'-P-CCNC: 10 U/L (ref 5–45)
BASOPHILS # BLD AUTO: 0.02 THOUSANDS/ΜL (ref 0–0.1)
BASOPHILS NFR BLD AUTO: 0 % (ref 0–1)
BILIRUB SERPL-MCNC: 0.6 MG/DL (ref 0.2–1)
BUN SERPL-MCNC: 27 MG/DL (ref 5–25)
CALCIUM SERPL-MCNC: 8.7 MG/DL (ref 8.3–10.1)
CHLORIDE SERPL-SCNC: 107 MMOL/L (ref 100–108)
CO2 SERPL-SCNC: 24 MMOL/L (ref 21–32)
CREAT SERPL-MCNC: 1.45 MG/DL (ref 0.6–1.3)
EOSINOPHIL # BLD AUTO: 0.23 THOUSAND/ΜL (ref 0–0.61)
EOSINOPHIL NFR BLD AUTO: 3 % (ref 0–6)
ERYTHROCYTE [DISTWIDTH] IN BLOOD BY AUTOMATED COUNT: 13.9 % (ref 11.6–15.1)
GFR SERPL CREATININE-BSD FRML MDRD: 56 ML/MIN/1.73SQ M
GLUCOSE SERPL-MCNC: 95 MG/DL (ref 65–140)
HCT VFR BLD AUTO: 45.4 % (ref 36.5–49.3)
HGB BLD-MCNC: 15.9 G/DL (ref 12–17)
IMM GRANULOCYTES # BLD AUTO: 0.02 THOUSAND/UL (ref 0–0.2)
IMM GRANULOCYTES NFR BLD AUTO: 0 % (ref 0–2)
INR PPP: 1.13 (ref 0.84–1.19)
LYMPHOCYTES # BLD AUTO: 2.6 THOUSANDS/ΜL (ref 0.6–4.47)
LYMPHOCYTES NFR BLD AUTO: 30 % (ref 14–44)
MCH RBC QN AUTO: 31.3 PG (ref 26.8–34.3)
MCHC RBC AUTO-ENTMCNC: 35 G/DL (ref 31.4–37.4)
MCV RBC AUTO: 89 FL (ref 82–98)
MONOCYTES # BLD AUTO: 0.79 THOUSAND/ΜL (ref 0.17–1.22)
MONOCYTES NFR BLD AUTO: 9 % (ref 4–12)
NEUTROPHILS # BLD AUTO: 5.08 THOUSANDS/ΜL (ref 1.85–7.62)
NEUTS SEG NFR BLD AUTO: 58 % (ref 43–75)
NRBC BLD AUTO-RTO: 0 /100 WBCS
PLATELET # BLD AUTO: 220 THOUSANDS/UL (ref 149–390)
PMV BLD AUTO: 9.4 FL (ref 8.9–12.7)
POTASSIUM SERPL-SCNC: 3.6 MMOL/L (ref 3.5–5.3)
PROT SERPL-MCNC: 7 G/DL (ref 6.4–8.2)
PROTHROMBIN TIME: 13.9 SECONDS (ref 11.6–14.5)
RBC # BLD AUTO: 5.08 MILLION/UL (ref 3.88–5.62)
SODIUM SERPL-SCNC: 141 MMOL/L (ref 136–145)
WBC # BLD AUTO: 8.74 THOUSAND/UL (ref 4.31–10.16)

## 2019-09-10 PROCEDURE — 85730 THROMBOPLASTIN TIME PARTIAL: CPT | Performed by: EMERGENCY MEDICINE

## 2019-09-10 PROCEDURE — 96360 HYDRATION IV INFUSION INIT: CPT

## 2019-09-10 PROCEDURE — 85610 PROTHROMBIN TIME: CPT | Performed by: EMERGENCY MEDICINE

## 2019-09-10 PROCEDURE — 99214 OFFICE O/P EST MOD 30 MIN: CPT | Performed by: NURSE PRACTITIONER

## 2019-09-10 PROCEDURE — 80053 COMPREHEN METABOLIC PANEL: CPT | Performed by: EMERGENCY MEDICINE

## 2019-09-10 PROCEDURE — 99284 EMERGENCY DEPT VISIT MOD MDM: CPT

## 2019-09-10 PROCEDURE — 82140 ASSAY OF AMMONIA: CPT | Performed by: EMERGENCY MEDICINE

## 2019-09-10 PROCEDURE — 85025 COMPLETE CBC W/AUTO DIFF WBC: CPT | Performed by: EMERGENCY MEDICINE

## 2019-09-10 PROCEDURE — 74176 CT ABD & PELVIS W/O CONTRAST: CPT

## 2019-09-10 PROCEDURE — 96361 HYDRATE IV INFUSION ADD-ON: CPT

## 2019-09-10 PROCEDURE — 36415 COLL VENOUS BLD VENIPUNCTURE: CPT | Performed by: EMERGENCY MEDICINE

## 2019-09-10 PROCEDURE — 99284 EMERGENCY DEPT VISIT MOD MDM: CPT | Performed by: EMERGENCY MEDICINE

## 2019-09-10 RX ORDER — OMEPRAZOLE 20 MG/1
40 CAPSULE, DELAYED RELEASE ORAL DAILY
COMMUNITY

## 2019-09-10 RX ORDER — FUROSEMIDE 20 MG/1
TABLET ORAL
COMMUNITY
Start: 2010-10-25 | End: 2019-10-02 | Stop reason: DRUGHIGH

## 2019-09-10 RX ORDER — TACROLIMUS 1 MG/1
CAPSULE ORAL
COMMUNITY
End: 2019-09-10 | Stop reason: SDUPTHER

## 2019-09-10 RX ORDER — AMLODIPINE BESYLATE 5 MG/1
TABLET ORAL
COMMUNITY
End: 2019-09-10 | Stop reason: SDUPTHER

## 2019-09-10 RX ORDER — MULTIVITAMIN
CAPSULE ORAL
COMMUNITY
End: 2019-09-10

## 2019-09-10 RX ADMIN — SODIUM CHLORIDE 1000 ML: 0.9 INJECTION, SOLUTION INTRAVENOUS at 22:43

## 2019-09-10 RX ADMIN — SODIUM CHLORIDE 1000 ML: 0.9 INJECTION, SOLUTION INTRAVENOUS at 21:38

## 2019-09-10 NOTE — PROGRESS NOTES
Assessment/Plan:  Pt resistant to go TO ED for work up for oliguria/ acute abd/pelvic pain - states more likely to go tonight - he is aware that his decision to wait or not go is against medical advise  rto in 2-3 weeks  No problem-specific Assessment & Plan notes found for this encounter  Diagnoses and all orders for this visit:    Oliguria  -     Transfer to other facility    Left flank tenderness  -     Transfer to other facility    Pelvic pain  -     Transfer to other facility    Other orders  -     Multiple Vitamins-Minerals (MULTIVITAMIN ADULT EXTRA C PO); daily  -     Discontinue: metoprolol tartrate (LOPRESSOR) 25 mg tablet; Take by mouth  -     Discontinue: amLODIPine (NORVASC) 5 mg tablet; Take by mouth  -     furosemide (LASIX) 20 mg tablet; Take by mouth  -     Discontinue: Multiple Vitamin (MULTIVITAMIN) capsule; 1 TABLET P O  DAILY  -     omeprazole (PRILOSEC) 20 mg delayed release capsule; Take by mouth  -     Discontinue: tacrolimus (PROGRAF) 1 mg capsule; Take by mouth  -     Cancel: influenza vaccine, 4154-0867, quadrivalent, recombinant, PF, 0 5 mL, for patients 18 yr+ (FLUBLOK)          Subjective:      Patient ID: Devin Ley is a 46 y o  male  HPI   Acute visit:  New onset of low urine output since SEPT 1, associated w/ dribbling about 1 qt a day despite inc furosemide 40 mg BID self inc'd to  120 mg daily, associated w/ abd pain/ pelvic pain  Baseline creat 1 1 - 1 5  PMH gastric bypass and liver transplant ( 2010)  Sees [facility] yearly  Inc peripheral fluid in legs end of day  NO sob/cp  He called [facility] 2 weeks ago ( sees yearly who told him to see his PCP and may need to see kidney specialist)  Discussed ED work up - he declined at first - requesting he "wants to see a kidney specialist"  I discussed with him that regardless he needs emergent work up in ED and may need both a kidney specialist and a urologist as I suspect a urinary calculi    He only agrees to go tonight bc his daughter has appointments today  The following portions of the patient's history were reviewed and updated as appropriate: allergies, past social history, past surgical history and problem list     Review of Systems   Constitutional: Negative for activity change and appetite change  HENT: Negative  Respiratory: Negative  Cardiovascular: Positive for leg swelling  Negative for chest pain  Gastrointestinal: Positive for abdominal pain  Negative for diarrhea, nausea and vomiting  Genitourinary: Positive for decreased urine volume, difficulty urinating and flank pain  Negative for dysuria, frequency, hematuria and urgency  Skin: Negative  Neurological: Negative  Hematological: Negative  Psychiatric/Behavioral: Negative  All other systems reviewed and are negative  Objective:    PHQ-9 Depression Screening    PHQ-9:    Frequency of the following problems over the past two weeks:       Little interest or pleasure in doing things:  0 - not at all  Feeling down, depressed, or hopeless:  0 - not at all  PHQ-2 Score:  0       /90 (BP Location: Left arm, Patient Position: Sitting, Cuff Size: Large)   Pulse 75   Temp 98 2 °F (36 8 °C) (Oral)   Resp 16   Ht 5' 9" (1 753 m)   Wt (!) 162 kg (356 lb 6 4 oz)   SpO2 98%   BMI 52 63 kg/m²          Physical Exam   Constitutional: He is oriented to person, place, and time  He appears well-developed and well-nourished  No distress  HENT:   Head: Atraumatic  Eyes: Conjunctivae are normal    Cardiovascular: Normal rate, regular rhythm and normal heart sounds  Pulmonary/Chest: Effort normal and breath sounds normal  No respiratory distress  Abdominal: Soft  Bowel sounds are normal  He exhibits no distension  There is tenderness (umbilicus)  RT CVA tenderness   Musculoskeletal: He exhibits edema (mild)  Neurological: He is alert and oriented to person, place, and time  Skin: Skin is warm and dry     Psychiatric: He has a normal mood and affect   His behavior is normal

## 2019-09-11 LAB
BACTERIA UR QL AUTO: ABNORMAL /HPF
BILIRUB UR QL STRIP: NEGATIVE
CAOX CRY URNS QL MICRO: ABNORMAL /HPF
CLARITY UR: CLEAR
COLOR UR: YELLOW
GLUCOSE UR STRIP-MCNC: NEGATIVE MG/DL
HGB UR QL STRIP.AUTO: NEGATIVE
KETONES UR STRIP-MCNC: NEGATIVE MG/DL
LEUKOCYTE ESTERASE UR QL STRIP: ABNORMAL
NITRITE UR QL STRIP: NEGATIVE
NON-SQ EPI CELLS URNS QL MICRO: ABNORMAL /HPF
PH UR STRIP.AUTO: 7 [PH] (ref 4.5–8)
PROT UR STRIP-MCNC: NEGATIVE MG/DL
RBC #/AREA URNS AUTO: ABNORMAL /HPF
SP GR UR STRIP.AUTO: 1.01 (ref 1–1.03)
UROBILINOGEN UR QL STRIP.AUTO: 4 E.U./DL
WBC #/AREA URNS AUTO: ABNORMAL /HPF

## 2019-09-11 PROCEDURE — 81001 URINALYSIS AUTO W/SCOPE: CPT

## 2019-09-11 NOTE — ED PROVIDER NOTES
History  Chief Complaint   Patient presents with    Oliguria or Anuria     Pt reports last urination was 4-5 hours ago  Pt was referred to ED from PCP for kidney failure eval         History provided by:  Patient   used: No     61-year-old male sent by PCP for further evaluation of decreased urinary output over the last 1-2 weeks  He states he has had some decreased amount of urination despite normal fluid intake and increasing his typical Lasix dose from 40 mg to 120 mg daily  He has a history of gastric bypass, liver transplant at [facility]  Had kidney disease years ago at the time of transplant but that had since improved  Patient denies any fever, chills, dysuria  He does report some right-sided inguinal pain with radiation to the right flank  States it is mild, not nearly as significant as when he had a ureteral stone in the past   Denies any injuries  On exam here he has some mild right CVA tenderness  No reproducible abdominal tenderness  No rashes  Plan UA, renal stone CT, labs, fluids, re-evaluate  Prior to Admission Medications   Prescriptions Last Dose Informant Patient Reported? Taking?    Cyanocobalamin (B-12) 1000 MCG CAPS  Self No No   Sig: Take 1 capsule (1,000 mcg total) by mouth daily   Multiple Vitamins-Minerals (MULTIVITAMIN ADULT EXTRA C PO)  Self Yes No   Sig: daily   Omega-3 Fatty Acids (FISH OIL) 1,000 mg  Self Yes No   Sig: Take by mouth daily   Thiamine HCl (VITAMIN B-1) 100 MG TABS  Self No No   Sig: Take 1 tablet (100 mg total) by mouth daily   Vitamin D, Cholecalciferol, 1000 units CAPS  Self No No   Sig: Take 1 tablet (1,000 Units total) by mouth daily   amLODIPine (NORVASC) 5 mg tablet  Self No No   Sig: TAKE 1 TABLET BY MOUTH TWICE DAILY   calcium carbonate-vitamin D (OSCAL-D) 500 mg-200 units per tablet  Self No No   Sig: Take 1 tablet by mouth daily   furosemide (LASIX) 20 mg tablet  Self Yes No   Sig: Take by mouth   furosemide (LASIX) 40 mg tablet  Self No No   Sig: TAKE 1 TABLET BY MOUTH DAILY   lactulose 20 g/30 mL  Self No No   Sig: Take 30 mL (20 g total) by mouth 2 (two) times a day   metoprolol succinate (TOPROL-XL) 25 mg 24 hr tablet  Self No No   Sig: TAKE 1 TABLET BY MOUTH TWICE DAILY   omeprazole (PRILOSEC) 20 mg delayed release capsule  Self Yes No   Sig: Take by mouth   tacrolimus (PROGRAF) 0 5 mg capsule  Self Yes No   Sig: Take by mouth   tacrolimus (PROGRAF) 1 mg capsule  Self Yes No   Sig: Take 1 5 mg by mouth 2 (two) times a day  Facility-Administered Medications: None       Past Medical History:   Diagnosis Date    Hematuria     Last assessed 11/25/15    Liver transplant recipient [facility])     GONZALEZ (nonalcoholic steatohepatitis)        Past Surgical History:   Procedure Laterality Date    APPENDECTOMY      CHOLECYSTECTOMY      HEPATITIS B SURFACE AB QN,LIVER TRANSPLANT (HISTORICAL)  2010    STOMACH SURGERY      2005 at [facility] History   Problem Relation Age of Onset    Breast cancer Mother     Coronary artery disease Father     Other Daughter         Mental disability    Drug abuse Neg Hx      I have reviewed and agree with the history as documented  Social History     Tobacco Use    Smoking status: Never Smoker    Smokeless tobacco: Never Used   Substance Use Topics    Alcohol use: No    Drug use: No        Review of Systems   Constitutional: Negative for activity change, appetite change, fatigue and fever  Respiratory: Negative for chest tightness and shortness of breath  Gastrointestinal: Positive for abdominal pain  Negative for diarrhea, nausea and vomiting  Genitourinary: Positive for decreased urine volume, difficulty urinating and flank pain  Negative for hematuria and testicular pain  Musculoskeletal: Negative for back pain and neck pain  Skin: Negative for color change and rash  Neurological: Negative for dizziness and weakness     All other systems reviewed and are negative  Physical Exam  Physical Exam   Constitutional: He is oriented to person, place, and time  He appears well-developed  Morbidly obese  HENT:   Head: Normocephalic  Mouth/Throat: Oropharynx is clear and moist    Neck: Normal range of motion  Neck supple  Cardiovascular: Normal rate and regular rhythm  Pulmonary/Chest: Effort normal  No respiratory distress  Abdominal: Soft  He exhibits no distension  There is no tenderness  Mild right CVA tenderness  Musculoskeletal: Normal range of motion  He exhibits no edema  Neurological: He is alert and oriented to person, place, and time  Skin: Skin is warm and dry  No rash noted  Nursing note and vitals reviewed        Vital Signs  ED Triage Vitals   Temperature Pulse Respirations Blood Pressure SpO2   09/10/19 2059 09/10/19 2058 09/10/19 2058 09/10/19 2058 09/10/19 2058   98 4 °F (36 9 °C) 70 18 (!) 166/103 96 %      Temp Source Heart Rate Source Patient Position - Orthostatic VS BP Location FiO2 (%)   09/10/19 2059 09/10/19 2058 09/10/19 2058 09/10/19 2058 --   Oral Monitor Sitting Right arm       Pain Score       09/10/19 2058       7           Vitals:    09/10/19 2058 09/10/19 2149 09/10/19 2300   BP: (!) 166/103 146/88 140/91   Pulse: 70 65 60   Patient Position - Orthostatic VS: Sitting Lying Lying         Visual Acuity      ED Medications  Medications   sodium chloride 0 9 % bolus 1,000 mL (0 mL Intravenous Stopped 9/10/19 2245)   sodium chloride 0 9 % bolus 1,000 mL (0 mL Intravenous Stopped 9/10/19 2338)       Diagnostic Studies  Results Reviewed     Procedure Component Value Units Date/Time    Urine Microscopic [[id number]]  (Abnormal) Collected:  09/11/19 0103    Lab Status:  Final result Specimen:  Urine Updated:  09/11/19 0110     RBC, UA 1-2 /hpf      WBC, UA 4-10 /hpf      Epithelial Cells Occasional /hpf      Bacteria, UA Occasional /hpf      Ca Oxalate Pearl, UA Occasional /hpf     ED Urine Macroscopic [[id number]]  (Abnormal) Collected:  09/11/19 0103    Lab Status:  Final result Specimen:  Urine Updated:  09/11/19 0050     Color, UA Yellow     Clarity, UA Clear     pH, UA 7 0     Leukocytes, UA Small     Nitrite, UA Negative     Protein, UA Negative mg/dl      Glucose, UA Negative mg/dl      Ketones, UA Negative mg/dl      Urobilinogen, UA 4 0 E U /dl      Bilirubin, UA Negative     Blood, UA Negative     Specific Gravity, UA 1 015    Narrative:       CLINITEK RESULT    Comprehensive metabolic panel [[id number]]  (Abnormal) Collected:  09/10/19 2134    Lab Status:  Final result Specimen:  Blood from Arm, Right Updated:  09/10/19 2201     Sodium 141 mmol/L      Potassium 3 6 mmol/L      Chloride 107 mmol/L      CO2 24 mmol/L      ANION GAP 10 mmol/L      BUN 27 mg/dL      Creatinine 1 45 mg/dL      Glucose 95 mg/dL      Calcium 8 7 mg/dL      AST 10 U/L      ALT 20 U/L      Alkaline Phosphatase 91 U/L      Total Protein 7 0 g/dL      Albumin 3 5 g/dL      Total Bilirubin 0 60 mg/dL      eGFR 56 ml/min/1 73sq m     Narrative:       [facility] guidelines for Chronic Kidney Disease (CKD):     Stage 1 with normal or high GFR (GFR > 90 mL/min/1 73 square meters)    Stage 2 Mild CKD (GFR = 60-89 mL/min/1 73 square meters)    Stage 3A Moderate CKD (GFR = 45-59 mL/min/1 73 square meters)    Stage 3B Moderate CKD (GFR = 30-44 mL/min/1 73 square meters)    Stage 4 Severe CKD (GFR = 15-29 mL/min/1 73 square meters)    Stage 5 End Stage CKD (GFR <15 mL/min/1 73 square meters)  Note: GFR calculation is accurate only with a steady state creatinine    Ammonia [[id number]]  (Abnormal) Collected:  09/10/19 2134    Lab Status:  Final result Specimen:  Blood from Arm, Right Updated:  09/10/19 2156     Ammonia 81 umol/L     Protime-INR [[id number]]  (Normal) Collected:  09/10/19 2134    Lab Status:  Final result Specimen:  Blood from Arm, Right Updated:  09/10/19 2154     Protime 13 9 seconds      INR 1 13    APTT [[id number]] (Normal) Collected:  09/10/19 2134    Lab Status:  Final result Specimen:  Blood from Arm, Right Updated:  09/10/19 2154     PTT 29 seconds     CBC and differential [[id number]] Collected:  09/10/19 2134    Lab Status:  Final result Specimen:  Blood from Arm, Right Updated:  09/10/19 2145     WBC 8 74 Thousand/uL      RBC 5 08 Million/uL      Hemoglobin 15 9 g/dL      Hematocrit 45 4 %      MCV 89 fL      MCH 31 3 pg      MCHC 35 0 g/dL      RDW 13 9 %      MPV 9 4 fL      Platelets 049 Thousands/uL      nRBC 0 /100 WBCs      Neutrophils Relative 58 %      Immat GRANS % 0 %      Lymphocytes Relative 30 %      Monocytes Relative 9 %      Eosinophils Relative 3 %      Basophils Relative 0 %      Neutrophils Absolute 5 08 Thousands/µL      Immature Grans Absolute 0 02 Thousand/uL      Lymphocytes Absolute 2 60 Thousands/µL      Monocytes Absolute 0 79 Thousand/µL      Eosinophils Absolute 0 23 Thousand/µL      Basophils Absolute 0 02 Thousands/µL                  CT renal stone study abdomen pelvis without contrast   Final Result by Ruth Ann Peters DO (09/10 2212)      Nonobstructing bilateral intrarenal calculi  No evidence of hydronephrosis             Workstation performed: [id number]                    Procedures  Procedures       ED Course  ED Course as of Sep 11 0134   Wed Sep 11, 2019   2676 Bladder scan greater than 800 mL  Patient completely asymptomatic, no urge to urinate  He refused straight cath  Unable to provide urine sample  MDM  Number of Diagnoses or Management Options  Urinary retention: new and requires workup  Diagnosis management comments: 57-year-old male with a history of liver transplant, gastric bypass presented for evaluation of decreased urination over the past few weeks despite normal fluid intake  His lab work was notable for a slight increase in creatinine from baseline  Was given 2 L of normal saline here but was unable to produce a urine sample    Bladder scan notable for greater than 800 mL  Patient denied having any symptoms of retention  Refused straight cath  Unable to completely assess presence of UTI  Lab work unremarkable  Referred to Urology for further workup  Amount and/or Complexity of Data Reviewed  Clinical lab tests: ordered and reviewed  Tests in the radiology section of CPT®: ordered and reviewed    Patient Progress  Patient progress: stable      Disposition  Final diagnoses:   Urinary retention     Time reflects when diagnosis was documented in both MDM as applicable and the Disposition within this note     Time User Action Codes Description Comment    9/11/2019 12:43 AM Aron Prader Add [R33 9] Urinary retention       ED Disposition     ED Disposition Condition Date/Time Comment    Discharge Stable Wed Sep 11, 2019 12:43 AM [place] Files discharge to home/self care  Follow-up Information     Follow up With Specialties Details Why Contact Info Additional [address] [facility] [place] [facility]  As needed K[address] [facility] [phone]  [phone]  [facility], [address] [place] [address], [facility] [address], [address] [facility], Nurse Practitioner   [address]   [address] [facility]  [phone]             Discharge Medication List as of 9/11/2019 12:44 AM      CONTINUE these medications which have NOT CHANGED    Details   amLODIPine (NORVASC) 5 mg tablet TAKE 1 TABLET BY MOUTH TWICE DAILY, Normal      calcium carbonate-vitamin D (OSCAL-D) 500 mg-200 units per tablet Take 1 tablet by mouth daily, Starting Mon 8/27/2018, Normal      Cyanocobalamin (B-12) 1000 MCG CAPS Take 1 capsule (1,000 mcg total) by mouth daily, Starting Mon 8/27/2018, Normal      !! furosemide (LASIX) 20 mg tablet Take by mouth, Starting Mon 10/25/2010, Historical Med      !! furosemide (LASIX) 40 mg tablet TAKE 1 TABLET BY MOUTH DAILY, Normal      lactulose 20 g/30 mL Take 30 mL (20 g total) by mouth 2 (two) times a day, Starting Tue 3/6/2018, Normal      metoprolol succinate (TOPROL-XL) 25 mg 24 hr tablet TAKE 1 TABLET BY MOUTH TWICE DAILY, Normal      Multiple Vitamins-Minerals (MULTIVITAMIN ADULT EXTRA C PO) daily, Starting Fri 10/21/2011, Historical Med      Omega-3 Fatty Acids (FISH OIL) 1,000 mg Take by mouth daily, Starting Thu 6/29/2017, Historical Med      omeprazole (PRILOSEC) 20 mg delayed release capsule Take by mouth, Historical Med      !! tacrolimus (PROGRAF) 0 5 mg capsule Take by mouth, Starting Mon 6/24/2013, Historical Med      !! tacrolimus (PROGRAF) 1 mg capsule Take 1 5 mg by mouth 2 (two) times a day , Historical Med      Thiamine HCl (VITAMIN B-1) 100 MG TABS Take 1 tablet (100 mg total) by mouth daily, Starting Mon 8/27/2018, Normal      Vitamin D, Cholecalciferol, 1000 units CAPS Take 1 tablet (1,000 Units total) by mouth daily, Starting Mon 8/27/2018, Normal       !! - Potential duplicate medications found  Please discuss with provider  No discharge procedures on file      ED Provider  Electronically Signed by           Danisha Driscoll MD  09/11/19 1846

## 2019-10-02 ENCOUNTER — OFFICE VISIT (OUTPATIENT)
Dept: FAMILY MEDICINE CLINIC | Facility: CLINIC | Age: 49
End: 2019-10-02
Payer: COMMERCIAL

## 2019-10-02 VITALS
BODY MASS INDEX: 46.65 KG/M2 | OXYGEN SATURATION: 98 % | WEIGHT: 315 LBS | HEIGHT: 69 IN | TEMPERATURE: 98.1 F | DIASTOLIC BLOOD PRESSURE: 90 MMHG | RESPIRATION RATE: 16 BRPM | SYSTOLIC BLOOD PRESSURE: 130 MMHG | HEART RATE: 73 BPM

## 2019-10-02 DIAGNOSIS — N18.30 CHRONIC KIDNEY DISEASE, STAGE 3 (HCC): ICD-10-CM

## 2019-10-02 DIAGNOSIS — R33.9 URINARY RETENTION: Primary | ICD-10-CM

## 2019-10-02 DIAGNOSIS — E66.01 CLASS 3 SEVERE OBESITY WITH SERIOUS COMORBIDITY AND BODY MASS INDEX (BMI) OF 50.0 TO 59.9 IN ADULT, UNSPECIFIED OBESITY TYPE (HCC): ICD-10-CM

## 2019-10-02 DIAGNOSIS — Z94.4 LIVER TRANSPLANTED (HCC): ICD-10-CM

## 2019-10-02 DIAGNOSIS — Z23 NEED FOR INFLUENZA VACCINATION: ICD-10-CM

## 2019-10-02 DIAGNOSIS — Z94.4 LIVER TRANSPLANT RECIPIENT (HCC): ICD-10-CM

## 2019-10-02 DIAGNOSIS — I10 ESSENTIAL HYPERTENSION: ICD-10-CM

## 2019-10-02 PROCEDURE — 99214 OFFICE O/P EST MOD 30 MIN: CPT | Performed by: NURSE PRACTITIONER

## 2019-10-02 PROCEDURE — 3008F BODY MASS INDEX DOCD: CPT | Performed by: NURSE PRACTITIONER

## 2019-10-02 PROCEDURE — 90682 RIV4 VACC RECOMBINANT DNA IM: CPT

## 2019-10-02 PROCEDURE — 90471 IMMUNIZATION ADMIN: CPT

## 2019-10-02 RX ORDER — AMLODIPINE BESYLATE 5 MG/1
5 TABLET ORAL 2 TIMES DAILY
Qty: 60 TABLET | Refills: 5 | Status: SHIPPED | OUTPATIENT
Start: 2019-10-02 | End: 2020-03-30 | Stop reason: SDUPTHER

## 2019-10-02 RX ORDER — FUROSEMIDE 40 MG/1
40 TABLET ORAL 2 TIMES DAILY
Qty: 60 TABLET | Refills: 5 | Status: SHIPPED | OUTPATIENT
Start: 2019-10-02 | End: 2020-02-05

## 2019-10-02 RX ORDER — METOPROLOL SUCCINATE 25 MG/1
25 TABLET, EXTENDED RELEASE ORAL 2 TIMES DAILY
Qty: 60 TABLET | Refills: 5 | Status: SHIPPED | OUTPATIENT
Start: 2019-10-02 | End: 2020-03-30 | Stop reason: SDUPTHER

## 2019-10-02 NOTE — PROGRESS NOTES
Assessment/Plan:  I have spent 25 minutes with Patient  today in which greater than 50% of this time was spent in counseling/coordination of care regarding Diagnostic results, Prognosis, Risks and benefits of tx options, Intructions for management, Patient and family education, Importance of tx compliance, Risk factor reductions and Impressions  Defer to specialists - pt is resistant to see urologist despite urine retention noted on US  RTO in 6 mos after labs  No problem-specific Assessment & Plan notes found for this encounter  Diagnoses and all orders for this visit:    Urinary retention  -     Ambulatory referral to Urology; Future    Chronic kidney disease, stage 3 ([facility] [address] )  -     Ambulatory referral to Nephrology; Future  -     Ammonia; Standing  -     Comprehensive metabolic panel; Standing  -     Tacrolimus level; Standing  -     CBC and Platelet; Standing  -     Protime-INR; Standing  -     Lipid panel; Future  -     Hemoglobin A1C; Future  -     Ammonia  -     Comprehensive metabolic panel  -     Tacrolimus level  -     CBC and Platelet  -     Protime-INR    Need for influenza vaccination  -     influenza vaccine, [id number], quadrivalent, recombinant, PF, 0 5 mL, for patients 18 yr+ (FLUBLOK)    Essential hypertension  -     Ammonia; Standing  -     Comprehensive metabolic panel; Standing  -     Tacrolimus level; Standing  -     CBC and Platelet; Standing  -     Protime-INR; Standing  -     Lipid panel; Future  -     Hemoglobin A1C; Future  -     Ammonia  -     Comprehensive metabolic panel  -     Tacrolimus level  -     CBC and Platelet  -     Protime-INR  -     amLODIPine (NORVASC) 5 mg tablet; Take 1 tablet (5 mg total) by mouth 2 (two) times a day  -     metoprolol succinate (TOPROL-XL) 25 mg 24 hr tablet; Take 1 tablet (25 mg total) by mouth 2 (two) times a day  -     furosemide (LASIX) 40 mg tablet;  Take 1 tablet (40 mg total) by mouth 2 (two) times a day    Class 3 severe obesity with serious comorbidity and body mass index (BMI) of 50 0 to 59 9 in adult, unspecified obesity type ([place] [id number] )  -     Ammonia; Standing  -     Comprehensive metabolic panel; Standing  -     Tacrolimus level; Standing  -     CBC and Platelet; Standing  -     Protime-INR; Standing  -     Lipid panel; Future  -     Hemoglobin A1C; Future  -     Ammonia  -     Comprehensive metabolic panel  -     Tacrolimus level  -     CBC and Platelet  -     Protime-INR    Liver transplant recipient ([place] [address] )  -     Ammonia; Standing  -     Comprehensive metabolic panel; Standing  -     Tacrolimus level; Standing  -     CBC and Platelet; Standing  -     Protime-INR; Standing  -     Lipid panel; Future  -     Hemoglobin A1C; Future  -     Ammonia  -     Comprehensive metabolic panel  -     Tacrolimus level  -     CBC and Platelet  -     Protime-INR    Liver transplanted (HCC)  -     furosemide (LASIX) 40 mg tablet; Take 1 tablet (40 mg total) by mouth 2 (two) times a day          Subjective:      Patient ID: Madeline Christie is a 44 y o  male  HPI  Here for lab review and follow up ED visit for oliguria - records reviewed - no hydronephrosis/ no stone but bladder scan + 800 mls - no urge to void - declined urology consult states "I dont have any symptoms - and team of [place] specialist want him to see kidney doctor"  Labs reviewed - creatinine is at baseline 1 19-1 50 on furosemide  mg daily  Urine + rbc and calcium crystals  No new complaints today  Follows w/ Berenda Back of [place] but obtains labs here  The following portions of the patient's history were reviewed and updated as appropriate: allergies, past social history, past surgical history and problem list     Review of Systems   Constitutional: Negative for activity change and appetite change  HENT: Negative  Respiratory: Negative  Cardiovascular: Negative  Negative for chest pain  Gastrointestinal: Negative  Endocrine: Negative    Negative for cold intolerance, polydipsia, polyphagia and polyuria  Genitourinary: Positive for decreased urine volume  Negative for difficulty urinating, dysuria, flank pain, frequency, hematuria and urgency  Musculoskeletal: Negative  Skin: Negative  Neurological: Negative  Hematological: Negative  Psychiatric/Behavioral: Negative  All other systems reviewed and are negative  Objective:      /90 (BP Location: Left arm, Patient Position: Sitting, Cuff Size: Large)   Pulse 73   Temp 98 1 °F (36 7 °C) (Oral)   Resp 16   Ht 5' 9" (1 753 m)   Wt (!) 163 kg (360 lb)   SpO2 98%   BMI 53 16 kg/m²          Physical Exam   Constitutional: He is oriented to person, place, and time  He appears well-developed and well-nourished  No distress  HENT:   Head: Atraumatic  Eyes: Conjunctivae are normal    Pulmonary/Chest: No respiratory distress  Abdominal: Soft  Bowel sounds are normal    Neurological: He is alert and oriented to person, place, and time  Skin: Skin is warm and dry  Psychiatric: He has a normal mood and affect   His behavior is normal

## 2019-11-12 DIAGNOSIS — G93.40 ENCEPHALOPATHY: ICD-10-CM

## 2019-11-12 LAB
ALBUMIN SERPL-MCNC: 3.8 G/DL (ref 3.6–5.1)
ALBUMIN/GLOB SERPL: 1.7 (CALC) (ref 1–2.5)
ALP SERPL-CCNC: 73 U/L (ref 40–115)
ALT SERPL-CCNC: 13 U/L (ref 9–46)
AMMONIA PLAS-SCNC: 112 UMOL/L
AST SERPL-CCNC: 14 U/L (ref 10–35)
BASOPHILS # BLD AUTO: 30 CELLS/UL (ref 0–200)
BASOPHILS NFR BLD AUTO: 0.4 %
BILIRUB SERPL-MCNC: 1.4 MG/DL (ref 0.2–1.2)
BUN SERPL-MCNC: 20 MG/DL (ref 7–25)
BUN/CREAT SERPL: ABNORMAL (CALC) (ref 6–22)
CALCIUM SERPL-MCNC: 9.1 MG/DL (ref 8.6–10.3)
CHLORIDE SERPL-SCNC: 111 MMOL/L (ref 98–110)
CHOLEST SERPL-MCNC: 130 MG/DL
CHOLEST/HDLC SERPL: 3.6 (CALC)
CO2 SERPL-SCNC: 23 MMOL/L (ref 20–32)
CREAT SERPL-MCNC: 1.13 MG/DL (ref 0.7–1.33)
EOSINOPHIL # BLD AUTO: 281 CELLS/UL (ref 15–500)
EOSINOPHIL NFR BLD AUTO: 3.7 %
ERYTHROCYTE [DISTWIDTH] IN BLOOD BY AUTOMATED COUNT: 13.4 % (ref 11–15)
GLOBULIN SER CALC-MCNC: 2.3 G/DL (CALC) (ref 1.9–3.7)
GLUCOSE SERPL-MCNC: 89 MG/DL (ref 65–99)
HBA1C MFR BLD: 5.2 % OF TOTAL HGB
HCT VFR BLD AUTO: 46.2 % (ref 38.5–50)
HDLC SERPL-MCNC: 36 MG/DL
HGB BLD-MCNC: 15.8 G/DL (ref 13.2–17.1)
INR PPP: 1.1
LDLC SERPL CALC-MCNC: 76 MG/DL (CALC)
LYMPHOCYTES # BLD AUTO: 2105 CELLS/UL (ref 850–3900)
LYMPHOCYTES NFR BLD AUTO: 27.7 %
MCH RBC QN AUTO: 30.8 PG (ref 27–33)
MCHC RBC AUTO-ENTMCNC: 34.2 G/DL (ref 32–36)
MCV RBC AUTO: 90.1 FL (ref 80–100)
MONOCYTES # BLD AUTO: 532 CELLS/UL (ref 200–950)
MONOCYTES NFR BLD AUTO: 7 %
NEUTROPHILS # BLD AUTO: 4651 CELLS/UL (ref 1500–7800)
NEUTROPHILS NFR BLD AUTO: 61.2 %
NONHDLC SERPL-MCNC: 94 MG/DL (CALC)
PLATELET # BLD AUTO: 213 THOUSAND/UL (ref 140–400)
PMV BLD REES-ECKER: 9.8 FL (ref 7.5–12.5)
POTASSIUM SERPL-SCNC: 4.2 MMOL/L (ref 3.5–5.3)
PROT SERPL-MCNC: 6.1 G/DL (ref 6.1–8.1)
PROTHROMBIN TIME: 11.2 SEC (ref 9–11.5)
RBC # BLD AUTO: 5.13 MILLION/UL (ref 4.2–5.8)
SL AMB EGFR AFRICAN AMERICAN: 87 ML/MIN/1.73M2
SL AMB EGFR NON AFRICAN AMERICAN: 75 ML/MIN/1.73M2
SODIUM SERPL-SCNC: 142 MMOL/L (ref 135–146)
TACROLIMUS BLD-MCNC: 1.9 MCG/L
TRIGL SERPL-MCNC: 93 MG/DL
WBC # BLD AUTO: 7.6 THOUSAND/UL (ref 3.8–10.8)

## 2019-11-13 ENCOUNTER — TELEPHONE (OUTPATIENT)
Dept: FAMILY MEDICINE CLINIC | Facility: CLINIC | Age: 49
End: 2019-11-13

## 2019-11-13 RX ORDER — LACTULOSE 20 G/30ML
20 SOLUTION ORAL 2 TIMES DAILY
Qty: 946 ML | Refills: 0 | Status: SHIPPED | OUTPATIENT
Start: 2019-11-13 | End: 2020-11-05 | Stop reason: SDUPTHER

## 2019-11-13 NOTE — PROGRESS NOTES
Called and spoke with patient about his tacrolimus level being too low, patient says he has been taking his   procraf as prescribed  I also informed of him Ammonia level being a little bit high patient denies any change in mentation  Patient was advised to contact his Liver transport team to adjust lactulose level  Patient agreed

## 2019-11-20 ENCOUNTER — CONSULT (OUTPATIENT)
Dept: NEPHROLOGY | Facility: CLINIC | Age: 49
End: 2019-11-20
Payer: COMMERCIAL

## 2019-11-20 VITALS
HEIGHT: 71 IN | HEART RATE: 74 BPM | WEIGHT: 315 LBS | BODY MASS INDEX: 44.1 KG/M2 | SYSTOLIC BLOOD PRESSURE: 112 MMHG | DIASTOLIC BLOOD PRESSURE: 78 MMHG

## 2019-11-20 DIAGNOSIS — Z94.4 LIVER TRANSPLANT RECIPIENT (HCC): ICD-10-CM

## 2019-11-20 DIAGNOSIS — E66.01 CLASS 3 SEVERE OBESITY WITH SERIOUS COMORBIDITY AND BODY MASS INDEX (BMI) OF 50.0 TO 59.9 IN ADULT, UNSPECIFIED OBESITY TYPE (HCC): ICD-10-CM

## 2019-11-20 DIAGNOSIS — N20.0 NEPHROLITHIASIS: ICD-10-CM

## 2019-11-20 DIAGNOSIS — Z98.84 H/O GASTRIC BYPASS: ICD-10-CM

## 2019-11-20 DIAGNOSIS — N18.30 CHRONIC KIDNEY DISEASE, STAGE 3 (HCC): Primary | ICD-10-CM

## 2019-11-20 PROCEDURE — 99204 OFFICE O/P NEW MOD 45 MIN: CPT | Performed by: INTERNAL MEDICINE

## 2019-11-20 NOTE — PROGRESS NOTES
OFFICE CONSULT - Nephrology   Kathleen Ly 44 y o  male MRN: [id number]        ASSESSMENT and PLAN:  Easton Butler was seen today for consult and chronic kidney disease  Diagnoses and all orders for this visit:    Chronic kidney disease, stage 3 ([facility] [address] )  -     Ambulatory referral to Nephrology  -     CBC; Future  -     Renal function panel; Future  -     PTH, intact; Future  -     Protein, Total w/Creat, Random Urine; Future  -     CBC  -     Renal function panel  -     PTH, intact  -     Protein, Total w/Creat, Random Urine    H/O gastric bypass    Liver transplant recipient [facility])    Class 3 severe obesity with serious comorbidity and body mass index (BMI) of 50 0 to 59 9 in adult, unspecified obesity type ([facility] [address] )    Nephrolithiasis        This is a 49-year-old morbidly obese patient with 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 in 08/2010 at [facility] in [place], [place], history morbid obesity status post gastric bypass surgery in January 2008, chronic kidney disease with creatinine fluctuating around 1 1-1 5, who was referred to our office for evaluation of CKD  1  Chronic kidney disease stage 2/3, most recent creatinine 1 13 with an estimated GFR of 75 on 11/11/2019  Noted previous creatinine fluctuating around 1 1-1 5 back since 2016  CKD suspected multifactorial in the setting of calcineurin inhibitors, morbid obesity, diuretic use  Discussed with patient that based on his most recent blood test his kidney function is stable  Advised to lose weight  Continue regular follow-up with his transplant center  Avoid NSAIDs  I would like to see him back in the office in 6 months with repeat labs  2  History of Verlon Clay cirrhosis status post OLT in 08/2010 at [facility]  Continue with regular follow-up with transplant center  He had an appointment next months for his yearly follow-up      3  History of hepatic encephalopathy, noted most recent ammonia level 112  On lactulose, further management per GI/transplant center  4  Morbid obesity status post gastric bypass surgery on 01/2008  Advised to lose weight  Recommend to consider nutrition evaluation for further comanagement  5  Hemodynamics, blood pressure well controlled, patient is taking metoprolol as well as amlodipine (amlodipine to increase tacrolimus level higher)    6  Most recent hemoglobin normal at 15 9 on 09/10/2018    7  Mineral bone disease, will check phosphorus and PTH with the next labs in 6 months        Patient Instructions   Based on your most recent blood test your kidney function is stable, most recent creatinine 1 1  I would like to see you back in the office in 6 months with another blood test   Remember to follow low-salt diet less than 2 g of salt in a day  Remember to stay well-hydrated to minimize the risk of forming kidney stones on the road  Continue with regular follow-up with transplant center  Advised to lose weight  Avoid NSAIDs (NO ibuprofen, Motrin, Advil, Aleve, naproxen)  Okay to take Tylenol or paracetamol or acetaminophen as needed for pain  HPI:  Kodak Duran is a 44 y o male who was referred by MICHELINE Mcguire for evaluation of Consult and Chronic Kidney Disease    This is a patient with history of morbid obesity status post gastric bypass surgery in 2008, history of Levon Breeding cirrhosis status post OLT in 08/2010 at [facility] in [place], history of hepatic encephalopathy, chronic kidney disease, who was referred to our office for evaluation      Patient presents to the office today, in general feeling okay other than some on and off episode of confusion and shakiness associated with hepatic encephalopathy, denies any chest pain or shortness of Breath, he noticed worsening lower extremity edema at the end of the day, denies any abdominal pain, no nausea, no vomiting, he have loose stools due to lactulose, no constipation  Denies any urinary problems  Denies any NSAID use  Family history of hypertension and diabetes, no family history of kidney disease  Denies tobacco abuse      I personally spent over half of a total 41 minutes face to face with the patient in counseling and discussion and/or coordination of care as described above  ROS: All the systems were reviewed and were negative except as documented on the HPI  Allergies: Patient has no known allergies      Medications:   Current Outpatient Medications:     amLODIPine (NORVASC) 5 mg tablet, Take 1 tablet (5 mg total) by mouth 2 (two) times a day, Disp: 60 tablet, Rfl: 5    furosemide (LASIX) 40 mg tablet, Take 1 tablet (40 mg total) by mouth 2 (two) times a day (Patient taking differently: Take 40 mg by mouth 2 tabs in the AM and 1 in the PM), Disp: 60 tablet, Rfl: 5    lactulose 20 g/30 mL, Take 30 mL (20 g total) by mouth 2 (two) times a day, Disp: 946 mL, Rfl: 0    metoprolol succinate (TOPROL-XL) 25 mg 24 hr tablet, Take 1 tablet (25 mg total) by mouth 2 (two) times a day, Disp: 60 tablet, Rfl: 5    Multiple Vitamins-Minerals (MULTIVITAMIN ADULT EXTRA C PO), daily, Disp: , Rfl:     omeprazole (PRILOSEC) 20 mg delayed release capsule, Take 40 mg by mouth daily , Disp: , Rfl:     tacrolimus (PROGRAF) 1 mg capsule, Take 2 mg by mouth 2 (two) times a day , Disp: , Rfl:     Vitamin D, Cholecalciferol, 1000 units CAPS, Take 1 tablet (1,000 Units total) by mouth daily, Disp: 90 capsule, Rfl: 3    calcium carbonate-vitamin D (OSCAL-D) 500 mg-200 units per tablet, Take 1 tablet by mouth daily (Patient not taking: Reported on 11/20/2019), Disp: 90 tablet, Rfl: 3    Cyanocobalamin (B-12) 1000 MCG CAPS, Take 1 capsule (1,000 mcg total) by mouth daily (Patient not taking: Reported on 11/20/2019), Disp: 90 capsule, Rfl: 3    Omega-3 Fatty Acids (FISH OIL) 1,000 mg, Take by mouth daily, Disp: , Rfl:     tacrolimus (PROGRAF) 0 5 mg capsule, Take by mouth, Disp: , Rfl:     Thiamine HCl (VITAMIN B-1) 100 MG TABS, Take 1 tablet (100 mg total) by mouth daily (Patient not taking: Reported on 11/20/2019), Disp: 90 tablet, Rfl: 3    Past Medical History:   Diagnosis Date    Hematuria     Last assessed 11/25/15    Liver transplant recipient [facility])     GONZALEZ (nonalcoholic steatohepatitis)      Past Surgical History:   Procedure Laterality Date    APPENDECTOMY      CHOLECYSTECTOMY      HEPATITIS B SURFACE AB QN,LIVER TRANSPLANT (HISTORICAL)  2010    STOMACH SURGERY      2005 at [facility] History   Problem Relation Age of Onset    Breast cancer Mother     Coronary artery disease Father     Other Daughter         Mental disability    Drug abuse Neg Hx       reports that he has never smoked  He has never used smokeless tobacco  He reports that he does not drink alcohol or use drugs  Physical Exam:   Vitals:    11/20/19 1250   BP: 112/78   BP Location: Right arm   Patient Position: Sitting   Cuff Size: Extra-Large   Pulse: 74   Weight: (!) 161 kg (354 lb)   Height: 5' 11" (1 803 m)     Body mass index is 49 37 kg/m²      General: morbid obese, cooperative, in not acute distress  Eyes: conjunctivae pink, anicteric sclerae  ENT: lips and mucous membranes moist  Neck: supple, no JVD  Chest: clear breath sounds bilateral, no crackles, ronchus or wheezings  CVS: distinct S1 & S2, normal rate, regular rhythm  Abdomen: obese, non-tender, non-distended, normoactive bowel sounds  Back: no CVA tenderness  Extremities: no significant edema of both legs  Skin: no rash  Neuro: awake, alert, oriented      Lab Results:   Results for orders placed or performed in visit on 11/11/19   Lipid panel   Result Value Ref Range    Total Cholesterol 130 <200 mg/dL    HDL 36 (L) >40 mg/dL    Triglycerides 93 <150 mg/dL    LDL Direct 76 mg/dL (calc)    Chol HDLC Ratio 3 6 <5 0 (calc)    Non-HDL Cholesterol 94 <130 mg/dL (calc)   Comprehensive metabolic panel   Result Value Ref Range Glucose, Random 89 65 - 99 mg/dL    BUN 20 7 - 25 mg/dL    Creatinine 1 13 0 70 - 1 33 mg/dL    eGFR Non  75 > OR = 60 mL/min/1 73m2    eGFR  87 > OR = 60 mL/min/1 73m2    SL AMB BUN/CREATININE RATIO NOT APPLICABLE 6 - 22 (calc)    Sodium 142 135 - 146 mmol/L    Potassium 4 2 3 5 - 5 3 mmol/L    Chloride 111 (H) 98 - 110 mmol/L    CO2 23 20 - 32 mmol/L    SL AMB CALCIUM 9 1 8 6 - 10 3 mg/dL    Protein, Total 6 1 6 1 - 8 1 g/dL    Albumin 3 8 3 6 - 5 1 g/dL    Globulin 2 3 1 9 - 3 7 g/dL (calc)    Albumin/Globulin Ratio 1 7 1 0 - 2 5 (calc)    TOTAL BILIRUBIN 1 4 (H) 0 2 - 1 2 mg/dL    Alkaline Phosphatase 73 40 - 115 U/L    AST 14 10 - 35 U/L    ALT 13 9 - 46 U/L   CBC and differential   Result Value Ref Range    White Blood Cell Count 7 6 3 8 - 10 8 Thousand/uL    Red Blood Cell Count 5 13 4 20 - 5 80 Million/uL    Hemoglobin 15 8 13 2 - 17 1 g/dL    HCT 46 2 38 5 - 50 0 %    MCV 90 1 80 0 - 100 0 fL    MCH 30 8 27 0 - 33 0 pg    MCHC 34 2 32 0 - 36 0 g/dL    RDW 13 4 11 0 - 15 0 %    Platelet Count 775 091 - 400 Thousand/uL    SL AMB MPV 9 8 7 5 - 12 5 fL    Neutrophils (Absolute) 4,651 1,500 - 7,800 cells/uL    Lymphocytes (Absolute) 2,105 850 - 3,900 cells/uL    Monocytes (Absolute) 532 200 - 950 cells/uL    Eosinophils (Absolute) 281 15 - 500 cells/uL    Basophils ABS 30 0 - 200 cells/uL    Neutrophils 61 2 %    Lymphocytes 27 7 %    Monocytes 7 0 %    Eosinophils 3 7 %    Basophils PCT 0 4 %   Protime-INR   Result Value Ref Range    INR 1 1     Prothrombin Time 11 2 9 0 - 11 5 sec   Hemoglobin A1c (w/out EAG)   Result Value Ref Range    Hemoglobin A1C 5 2 <5 7 % of total Hgb   Ammonia   Result Value Ref Range    Ammonia (P) 112 (H) < OR = 72 umol/L   Tacrolimus level   Result Value Ref Range    Tacrolimus, Highly Sensitive, LC/MS/MS 1 9 (L) mcg/L               Portions of the record may have been created with voice recognition software   Occasional wrong word or "sound a like" substitutions may have occurred due to the inherent limitations of voice recognition software  Read the chart carefully and recognize, using context, where substitutions have occurred  If you have any questions, please contact the dictating provider

## 2019-11-20 NOTE — PATIENT INSTRUCTIONS
Based on your most recent blood test your kidney function is stable, most recent creatinine 1 1  I would like to see you back in the office in 6 months with another blood test   Remember to follow low-salt diet less than 2 g of salt in a day  Remember to stay well-hydrated to minimize the risk of forming kidney stones on the road  Continue with regular follow-up with transplant center  Advised to lose weight  Avoid NSAIDs (NO ibuprofen, Motrin, Advil, Aleve, naproxen)  Okay to take Tylenol or paracetamol or acetaminophen as needed for pain

## 2020-02-02 DIAGNOSIS — Z94.4 LIVER TRANSPLANTED (HCC): ICD-10-CM

## 2020-02-02 DIAGNOSIS — I10 ESSENTIAL HYPERTENSION: ICD-10-CM

## 2020-02-05 RX ORDER — FUROSEMIDE 40 MG/1
TABLET ORAL
Qty: 60 TABLET | Refills: 1 | Status: SHIPPED | OUTPATIENT
Start: 2020-02-05 | End: 2020-03-02

## 2020-02-29 DIAGNOSIS — Z94.4 LIVER TRANSPLANTED (HCC): ICD-10-CM

## 2020-02-29 DIAGNOSIS — I10 ESSENTIAL HYPERTENSION: ICD-10-CM

## 2020-03-02 RX ORDER — FUROSEMIDE 40 MG/1
TABLET ORAL
Qty: 60 TABLET | Refills: 0 | Status: SHIPPED | OUTPATIENT
Start: 2020-03-02 | End: 2020-04-02

## 2020-03-23 LAB
ALBUMIN SERPL-MCNC: 3.9 G/DL (ref 3.6–5.1)
ALBUMIN/GLOB SERPL: 1.4 (CALC) (ref 1–2.5)
ALP SERPL-CCNC: 75 U/L (ref 35–144)
ALT SERPL-CCNC: 18 U/L (ref 9–46)
AMMONIA PLAS-SCNC: 107 UMOL/L
AST SERPL-CCNC: 15 U/L (ref 10–35)
BASOPHILS # BLD AUTO: 33 CELLS/UL (ref 0–200)
BASOPHILS NFR BLD AUTO: 0.5 %
BILIRUB SERPL-MCNC: 1.5 MG/DL (ref 0.2–1.2)
BUN SERPL-MCNC: 16 MG/DL (ref 7–25)
BUN/CREAT SERPL: ABNORMAL (CALC) (ref 6–22)
CALCIUM SERPL-MCNC: 9.1 MG/DL (ref 8.6–10.3)
CHLORIDE SERPL-SCNC: 110 MMOL/L (ref 98–110)
CHOLEST SERPL-MCNC: 139 MG/DL
CHOLEST/HDLC SERPL: 3.5 (CALC)
CO2 SERPL-SCNC: 22 MMOL/L (ref 20–32)
CREAT SERPL-MCNC: 1.24 MG/DL (ref 0.7–1.33)
EOSINOPHIL # BLD AUTO: 310 CELLS/UL (ref 15–500)
EOSINOPHIL NFR BLD AUTO: 4.7 %
ERYTHROCYTE [DISTWIDTH] IN BLOOD BY AUTOMATED COUNT: 14.2 % (ref 11–15)
GLOBULIN SER CALC-MCNC: 2.7 G/DL (CALC) (ref 1.9–3.7)
GLUCOSE SERPL-MCNC: 103 MG/DL (ref 65–99)
HBA1C MFR BLD: 5.3 % OF TOTAL HGB
HCT VFR BLD AUTO: 47.7 % (ref 38.5–50)
HDLC SERPL-MCNC: 40 MG/DL
HGB BLD-MCNC: 16.7 G/DL (ref 13.2–17.1)
INR PPP: 1.2
LDLC SERPL CALC-MCNC: 83 MG/DL (CALC)
LYMPHOCYTES # BLD AUTO: 2277 CELLS/UL (ref 850–3900)
LYMPHOCYTES NFR BLD AUTO: 34.5 %
MCH RBC QN AUTO: 30.6 PG (ref 27–33)
MCHC RBC AUTO-ENTMCNC: 35 G/DL (ref 32–36)
MCV RBC AUTO: 87.4 FL (ref 80–100)
MONOCYTES # BLD AUTO: 535 CELLS/UL (ref 200–950)
MONOCYTES NFR BLD AUTO: 8.1 %
NEUTROPHILS # BLD AUTO: 3445 CELLS/UL (ref 1500–7800)
NEUTROPHILS NFR BLD AUTO: 52.2 %
NONHDLC SERPL-MCNC: 99 MG/DL (CALC)
PLATELET # BLD AUTO: 198 THOUSAND/UL (ref 140–400)
PMV BLD REES-ECKER: 9.8 FL (ref 7.5–12.5)
POTASSIUM SERPL-SCNC: 3.8 MMOL/L (ref 3.5–5.3)
PROT SERPL-MCNC: 6.6 G/DL (ref 6.1–8.1)
PROTHROMBIN TIME: 11.7 SEC (ref 9–11.5)
RBC # BLD AUTO: 5.46 MILLION/UL (ref 4.2–5.8)
SL AMB EGFR AFRICAN AMERICAN: 78 ML/MIN/1.73M2
SL AMB EGFR NON AFRICAN AMERICAN: 67 ML/MIN/1.73M2
SODIUM SERPL-SCNC: 142 MMOL/L (ref 135–146)
TACROLIMUS BLD-MCNC: 8.9 MCG/L
TRIGL SERPL-MCNC: 84 MG/DL
WBC # BLD AUTO: 6.6 THOUSAND/UL (ref 3.8–10.8)

## 2020-03-30 DIAGNOSIS — I10 ESSENTIAL HYPERTENSION: ICD-10-CM

## 2020-03-30 NOTE — TELEPHONE ENCOUNTER
insruance will only pay for amlodipine 10mg once daily or 1/2 10mg twice a day     Insurance will pay for metoprolol er 50mg or 1/2 of the er 50mg  2x daily

## 2020-03-31 ENCOUNTER — TELEPHONE (OUTPATIENT)
Dept: NEPHROLOGY | Facility: CLINIC | Age: 50
End: 2020-03-31

## 2020-03-31 RX ORDER — METOPROLOL SUCCINATE 50 MG/1
50 TABLET, EXTENDED RELEASE ORAL DAILY
Qty: 30 TABLET | Refills: 5 | Status: SHIPPED | OUTPATIENT
Start: 2020-03-31 | End: 2020-11-05 | Stop reason: SDUPTHER

## 2020-03-31 RX ORDER — AMLODIPINE BESYLATE 10 MG/1
10 TABLET ORAL
Qty: 30 TABLET | Refills: 5 | Status: SHIPPED | OUTPATIENT
Start: 2020-03-31 | End: 2020-08-12 | Stop reason: SDUPTHER

## 2020-04-02 ENCOUNTER — TELEPHONE (OUTPATIENT)
Dept: NEPHROLOGY | Facility: CLINIC | Age: 50
End: 2020-04-02

## 2020-04-02 DIAGNOSIS — I10 ESSENTIAL HYPERTENSION: ICD-10-CM

## 2020-04-02 DIAGNOSIS — Z94.4 LIVER TRANSPLANTED (HCC): ICD-10-CM

## 2020-04-02 RX ORDER — FUROSEMIDE 40 MG/1
TABLET ORAL
Qty: 60 TABLET | Refills: 0 | Status: SHIPPED | OUTPATIENT
Start: 2020-04-02 | End: 2020-04-29

## 2020-04-28 DIAGNOSIS — Z94.4 LIVER TRANSPLANTED (HCC): ICD-10-CM

## 2020-04-28 DIAGNOSIS — I10 ESSENTIAL HYPERTENSION: ICD-10-CM

## 2020-04-29 ENCOUNTER — OFFICE VISIT (OUTPATIENT)
Dept: FAMILY MEDICINE CLINIC | Facility: CLINIC | Age: 50
End: 2020-04-29
Payer: COMMERCIAL

## 2020-04-29 VITALS
RESPIRATION RATE: 16 BRPM | HEART RATE: 66 BPM | WEIGHT: 315 LBS | OXYGEN SATURATION: 98 % | DIASTOLIC BLOOD PRESSURE: 90 MMHG | TEMPERATURE: 97.7 F | BODY MASS INDEX: 44.1 KG/M2 | HEIGHT: 71 IN | SYSTOLIC BLOOD PRESSURE: 130 MMHG

## 2020-04-29 DIAGNOSIS — Z00.00 ANNUAL PHYSICAL EXAM: ICD-10-CM

## 2020-04-29 DIAGNOSIS — Z94.4 LIVER TRANSPLANT RECIPIENT (HCC): Primary | ICD-10-CM

## 2020-04-29 DIAGNOSIS — I10 ESSENTIAL HYPERTENSION: ICD-10-CM

## 2020-04-29 DIAGNOSIS — N18.30 CHRONIC KIDNEY DISEASE, STAGE 3 (HCC): ICD-10-CM

## 2020-04-29 DIAGNOSIS — K76.6 PORTAL HYPERTENSION (HCC): ICD-10-CM

## 2020-04-29 DIAGNOSIS — Z98.84 H/O GASTRIC BYPASS: ICD-10-CM

## 2020-04-29 PROBLEM — R10.2 PELVIC PAIN: Status: RESOLVED | Noted: 2019-09-10 | Resolved: 2020-04-29

## 2020-04-29 PROBLEM — R33.9 URINARY RETENTION: Status: RESOLVED | Noted: 2019-10-02 | Resolved: 2020-04-29

## 2020-04-29 PROBLEM — R34 OLIGURIA: Status: RESOLVED | Noted: 2019-09-10 | Resolved: 2020-04-29

## 2020-04-29 PROCEDURE — 99214 OFFICE O/P EST MOD 30 MIN: CPT | Performed by: FAMILY MEDICINE

## 2020-04-29 PROCEDURE — 1036F TOBACCO NON-USER: CPT | Performed by: FAMILY MEDICINE

## 2020-04-29 PROCEDURE — 3080F DIAST BP >= 90 MM HG: CPT | Performed by: FAMILY MEDICINE

## 2020-04-29 PROCEDURE — 3008F BODY MASS INDEX DOCD: CPT | Performed by: FAMILY MEDICINE

## 2020-04-29 PROCEDURE — 3075F SYST BP GE 130 - 139MM HG: CPT | Performed by: FAMILY MEDICINE

## 2020-04-29 RX ORDER — FUROSEMIDE 40 MG/1
TABLET ORAL
Qty: 60 TABLET | Refills: 5 | Status: SHIPPED | OUTPATIENT
Start: 2020-04-29 | End: 2020-09-09

## 2020-07-14 ENCOUNTER — TELEPHONE (OUTPATIENT)
Dept: NEPHROLOGY | Facility: CLINIC | Age: 50
End: 2020-07-14

## 2020-07-14 NOTE — TELEPHONE ENCOUNTER
Patient called back and is not ready to go to a doctors office right now  He is also not interested in doing a virtual visit  He will call back when he is ready to schedule an appointment

## 2020-07-14 NOTE — TELEPHONE ENCOUNTER
LM for patient to schedule follow up appointment  Our Office has virtual visits available this week

## 2020-08-12 DIAGNOSIS — I10 ESSENTIAL HYPERTENSION: ICD-10-CM

## 2020-08-12 RX ORDER — AMLODIPINE BESYLATE 10 MG/1
10 TABLET ORAL
Qty: 90 TABLET | Refills: 1 | Status: SHIPPED | OUTPATIENT
Start: 2020-08-12 | End: 2021-02-03

## 2020-08-27 DIAGNOSIS — Z94.4 LIVER TRANSPLANTED (HCC): ICD-10-CM

## 2020-08-27 DIAGNOSIS — I10 ESSENTIAL HYPERTENSION: ICD-10-CM

## 2020-09-09 DIAGNOSIS — Z94.4 LIVER TRANSPLANTED (HCC): ICD-10-CM

## 2020-09-09 DIAGNOSIS — I10 ESSENTIAL HYPERTENSION: ICD-10-CM

## 2020-09-09 RX ORDER — FUROSEMIDE 40 MG/1
40 TABLET ORAL 2 TIMES DAILY
Qty: 60 TABLET | Refills: 5 | OUTPATIENT
Start: 2020-09-09

## 2020-09-09 RX ORDER — FUROSEMIDE 40 MG/1
TABLET ORAL
Qty: 180 TABLET | Refills: 1 | Status: SHIPPED | OUTPATIENT
Start: 2020-09-09

## 2020-10-07 ENCOUNTER — CLINICAL SUPPORT (OUTPATIENT)
Dept: FAMILY MEDICINE CLINIC | Facility: CLINIC | Age: 50
End: 2020-10-07
Payer: COMMERCIAL

## 2020-10-07 DIAGNOSIS — Z23 ENCOUNTER FOR IMMUNIZATION: Primary | ICD-10-CM

## 2020-10-07 PROCEDURE — 90471 IMMUNIZATION ADMIN: CPT

## 2020-10-07 PROCEDURE — 90682 RIV4 VACC RECOMBINANT DNA IM: CPT

## 2020-11-05 ENCOUNTER — TELEMEDICINE (OUTPATIENT)
Dept: FAMILY MEDICINE CLINIC | Facility: CLINIC | Age: 50
End: 2020-11-05
Payer: COMMERCIAL

## 2020-11-05 VITALS — WEIGHT: 315 LBS | BODY MASS INDEX: 44.1 KG/M2 | HEIGHT: 71 IN

## 2020-11-05 DIAGNOSIS — K76.6 PORTAL HYPERTENSION (HCC): ICD-10-CM

## 2020-11-05 DIAGNOSIS — Z98.84 H/O GASTRIC BYPASS: ICD-10-CM

## 2020-11-05 DIAGNOSIS — I10 ESSENTIAL HYPERTENSION: ICD-10-CM

## 2020-11-05 DIAGNOSIS — Z94.4 HISTORY OF LIVER TRANSPLANT (HCC): ICD-10-CM

## 2020-11-05 DIAGNOSIS — G93.40 ENCEPHALOPATHY: ICD-10-CM

## 2020-11-05 DIAGNOSIS — Z12.11 SCREENING FOR COLON CANCER: Primary | ICD-10-CM

## 2020-11-05 DIAGNOSIS — Z00.00 ANNUAL PHYSICAL EXAM: ICD-10-CM

## 2020-11-05 PROCEDURE — 1036F TOBACCO NON-USER: CPT | Performed by: FAMILY MEDICINE

## 2020-11-05 PROCEDURE — 99396 PREV VISIT EST AGE 40-64: CPT | Performed by: FAMILY MEDICINE

## 2020-11-05 PROCEDURE — 3008F BODY MASS INDEX DOCD: CPT | Performed by: FAMILY MEDICINE

## 2020-11-05 RX ORDER — LACTULOSE 20 G/30ML
20 SOLUTION ORAL 2 TIMES DAILY
Qty: 946 ML | Refills: 0 | Status: SHIPPED | OUTPATIENT
Start: 2020-11-05 | End: 2021-02-03

## 2020-11-05 RX ORDER — METOPROLOL SUCCINATE 50 MG/1
50 TABLET, EXTENDED RELEASE ORAL DAILY
Qty: 90 TABLET | Refills: 1 | Status: SHIPPED | OUTPATIENT
Start: 2020-11-05

## 2020-11-18 DIAGNOSIS — N18.30 STAGE 3 CHRONIC KIDNEY DISEASE, UNSPECIFIED WHETHER STAGE 3A OR 3B CKD (HCC): Primary | ICD-10-CM

## 2020-11-28 LAB
ALBUMIN SERPL-MCNC: 4.1 G/DL (ref 3.6–5.1)
BUN SERPL-MCNC: 24 MG/DL (ref 7–25)
BUN/CREAT SERPL: 17 (CALC) (ref 6–22)
CALCIUM SERPL-MCNC: 9.2 MG/DL (ref 8.6–10.3)
CHLORIDE SERPL-SCNC: 109 MMOL/L (ref 98–110)
CO2 SERPL-SCNC: 26 MMOL/L (ref 20–32)
CREAT SERPL-MCNC: 1.41 MG/DL (ref 0.7–1.33)
GLUCOSE SERPL-MCNC: 87 MG/DL (ref 65–99)
PHOSPHATE SERPL-MCNC: 2.8 MG/DL (ref 2.5–4.5)
POTASSIUM SERPL-SCNC: 4.1 MMOL/L (ref 3.5–5.3)
SL AMB EGFR AFRICAN AMERICAN: 66 ML/MIN/1.73M2
SL AMB EGFR NON AFRICAN AMERICAN: 57 ML/MIN/1.73M2
SODIUM SERPL-SCNC: 142 MMOL/L (ref 135–146)

## 2021-02-02 DIAGNOSIS — I10 ESSENTIAL HYPERTENSION: ICD-10-CM

## 2021-02-02 DIAGNOSIS — G93.40 ENCEPHALOPATHY: ICD-10-CM

## 2021-02-03 RX ORDER — AMLODIPINE BESYLATE 10 MG/1
TABLET ORAL
Qty: 90 TABLET | Refills: 1 | Status: SHIPPED | OUTPATIENT
Start: 2021-02-03

## 2021-02-03 RX ORDER — LACTULOSE 10 G/15ML
SOLUTION ORAL
Qty: 946 ML | Refills: 1 | Status: SHIPPED | OUTPATIENT
Start: 2021-02-03